# Patient Record
Sex: FEMALE | Race: BLACK OR AFRICAN AMERICAN | Employment: FULL TIME | URBAN - METROPOLITAN AREA
[De-identification: names, ages, dates, MRNs, and addresses within clinical notes are randomized per-mention and may not be internally consistent; named-entity substitution may affect disease eponyms.]

---

## 2020-08-26 ENCOUNTER — HOSPITAL ENCOUNTER (EMERGENCY)
Facility: HOSPITAL | Age: 30
Discharge: HOME | End: 2020-08-27
Attending: EMERGENCY MEDICINE
Payer: OTHER GOVERNMENT

## 2020-08-26 VITALS
BODY MASS INDEX: 35.08 KG/M2 | HEART RATE: 76 BPM | OXYGEN SATURATION: 97 % | RESPIRATION RATE: 18 BRPM | SYSTOLIC BLOOD PRESSURE: 135 MMHG | HEIGHT: 63 IN | DIASTOLIC BLOOD PRESSURE: 75 MMHG | WEIGHT: 198 LBS | TEMPERATURE: 99 F

## 2020-08-26 DIAGNOSIS — Z20.822 ENCOUNTER FOR LABORATORY TESTING FOR COVID-19 VIRUS: Primary | ICD-10-CM

## 2020-08-26 PROCEDURE — 99283 EMERGENCY DEPT VISIT LOW MDM: CPT

## 2020-08-27 NOTE — ED PROVIDER NOTES
Patient Seen in: [facility] AND [facility] Emergency Department      History   Patient presents with:  Testing    Stated Complaint: Testing     HPI    71-year-old female with no significant past medical history here for Covid testing.   Pt had exposure to her cou Movements: Extraocular movements intact. Neck:      Musculoskeletal: Neck supple. Cardiovascular:      Rate and Rhythm: Regular rhythm. Pulmonary:      Effort: Pulmonary effort is normal.   Abdominal:      Palpations: Abdomen is soft.    Musculoskelet for laboratory testing for COVID-19 virus  (primary encounter diagnosis)    Disposition:  Discharge  8/27/2020 12:12 am    Follow-up:  Jaqueline Davison MD  [address] [place] [address]  [phone]    Schedule an appointment as soon as p

## 2020-08-30 LAB — SARS-COV-2 BY PCR: NOT DETECTED

## 2021-12-24 ENCOUNTER — HOSPITAL ENCOUNTER (EMERGENCY)
Facility: HOSPITAL | Age: 31
Discharge: HOME | End: 2021-12-24
Payer: MEDICAID

## 2021-12-24 VITALS
RESPIRATION RATE: 18 BRPM | HEART RATE: 79 BPM | DIASTOLIC BLOOD PRESSURE: 78 MMHG | OXYGEN SATURATION: 99 % | SYSTOLIC BLOOD PRESSURE: 108 MMHG | BODY MASS INDEX: 36.5 KG/M2 | WEIGHT: 206 LBS | HEIGHT: 63 IN | TEMPERATURE: 98 F

## 2021-12-24 DIAGNOSIS — Z20.822 CLOSE EXPOSURE TO COVID-19 VIRUS: Primary | ICD-10-CM

## 2021-12-24 PROCEDURE — 99283 EMERGENCY DEPT VISIT LOW MDM: CPT

## 2021-12-24 NOTE — ED PROVIDER NOTES
Patient Seen in: [facility] [place] AND [facility] Emergency Department      History   Patient presents with:  Testing    Stated Complaint: testing, congestion postive exposure    Subjective:   HPI    79-year-old female presents the emergency department for Covid testin Effort: Pulmonary effort is normal.   Musculoskeletal:         General: Normal range of motion. Cervical back: Normal range of motion. Skin:     General: Skin is warm and dry. Capillary Refill: Capillary refill takes less than 2 seconds.

## 2023-05-23 ENCOUNTER — OFFICE VISIT (OUTPATIENT)
Dept: FAMILY MEDICINE CLINIC | Facility: CLINIC | Age: 33
End: 2023-05-23
Payer: COMMERCIAL

## 2023-05-23 ENCOUNTER — HOSPITAL ENCOUNTER (OUTPATIENT)
Dept: GENERAL RADIOLOGY | Age: 33
Discharge: HOME | End: 2023-05-23
Attending: STUDENT IN AN ORGANIZED HEALTH CARE EDUCATION/TRAINING PROGRAM
Payer: COMMERCIAL

## 2023-05-23 ENCOUNTER — TELEPHONE (OUTPATIENT)
Dept: PODIATRY CLINIC | Facility: CLINIC | Age: 33
End: 2023-05-23

## 2023-05-23 ENCOUNTER — LAB ENCOUNTER (OUTPATIENT)
Dept: LAB | Age: 33
End: 2023-05-23
Attending: STUDENT IN AN ORGANIZED HEALTH CARE EDUCATION/TRAINING PROGRAM
Payer: COMMERCIAL

## 2023-05-23 VITALS
DIASTOLIC BLOOD PRESSURE: 76 MMHG | RESPIRATION RATE: 16 BRPM | WEIGHT: 208.19 LBS | SYSTOLIC BLOOD PRESSURE: 102 MMHG | OXYGEN SATURATION: 98 % | TEMPERATURE: 98 F | HEIGHT: 63 IN | BODY MASS INDEX: 36.89 KG/M2 | HEART RATE: 102 BPM

## 2023-05-23 DIAGNOSIS — Z00.00 ROUTINE MEDICAL EXAM: Primary | ICD-10-CM

## 2023-05-23 DIAGNOSIS — E66.9 OBESITY, CLASS II, BMI 35-39.9: ICD-10-CM

## 2023-05-23 DIAGNOSIS — Q31.8 ANOMALY OF EPIGLOTTIS: ICD-10-CM

## 2023-05-23 DIAGNOSIS — M79.674 PAIN OF TOE OF RIGHT FOOT: ICD-10-CM

## 2023-05-23 DIAGNOSIS — D64.9 ANEMIA, UNSPECIFIED TYPE: ICD-10-CM

## 2023-05-23 DIAGNOSIS — Z23 ENCOUNTER FOR IMMUNIZATION: ICD-10-CM

## 2023-05-23 DIAGNOSIS — S92.511A CLOSED DISPLACED FRACTURE OF PROXIMAL PHALANX OF LESSER TOE OF RIGHT FOOT, INITIAL ENCOUNTER: Primary | ICD-10-CM

## 2023-05-23 DIAGNOSIS — S99.921A INJURY OF TOE ON RIGHT FOOT, INITIAL ENCOUNTER: ICD-10-CM

## 2023-05-23 DIAGNOSIS — Z00.00 ROUTINE MEDICAL EXAM: ICD-10-CM

## 2023-05-23 LAB
ALBUMIN SERPL-MCNC: 3.3 G/DL (ref 3.4–5)
ALBUMIN/GLOB SERPL: 0.8 {RATIO} (ref 1–2)
ALP LIVER SERPL-CCNC: 69 U/L
ALT SERPL-CCNC: 15 U/L
ANION GAP SERPL CALC-SCNC: 5 MMOL/L (ref 0–18)
AST SERPL-CCNC: 16 U/L (ref 15–37)
BILIRUB SERPL-MCNC: 0.3 MG/DL (ref 0.1–2)
BUN BLD-MCNC: 11 MG/DL (ref 7–18)
BUN/CREAT SERPL: 13.1 (ref 10–20)
CALCIUM BLD-MCNC: 8.9 MG/DL (ref 8.5–10.1)
CHLORIDE SERPL-SCNC: 110 MMOL/L (ref 98–112)
CHOLEST SERPL-MCNC: 234 MG/DL (ref ?–200)
CO2 SERPL-SCNC: 25 MMOL/L (ref 21–32)
CREAT BLD-MCNC: 0.84 MG/DL
DEPRECATED HBV CORE AB SER IA-ACNC: 9.4 NG/ML
DEPRECATED RDW RBC AUTO: 44 FL (ref 35.1–46.3)
ERYTHROCYTE [DISTWIDTH] IN BLOOD BY AUTOMATED COUNT: 14 % (ref 11–15)
EST. AVERAGE GLUCOSE BLD GHB EST-MCNC: 114 MG/DL (ref 68–126)
FASTING PATIENT LIPID ANSWER: YES
FASTING STATUS PATIENT QL REPORTED: YES
GFR SERPLBLD BASED ON 1.73 SQ M-ARVRAT: 94 ML/MIN/1.73M2 (ref 60–?)
GLOBULIN PLAS-MCNC: 4.4 G/DL (ref 2.8–4.4)
GLUCOSE BLD-MCNC: 83 MG/DL (ref 70–99)
HBA1C MFR BLD: 5.6 % (ref ?–5.7)
HCT VFR BLD AUTO: 34.2 %
HDLC SERPL-MCNC: 57 MG/DL (ref 40–59)
HGB BLD-MCNC: 11.6 G/DL
IRON SATN MFR SERPL: 17 %
IRON SERPL-MCNC: 65 UG/DL
LDLC SERPL CALC-MCNC: 165 MG/DL (ref ?–100)
MCH RBC QN AUTO: 29.3 PG (ref 26–34)
MCHC RBC AUTO-ENTMCNC: 33.9 G/DL (ref 31–37)
MCV RBC AUTO: 86.4 FL
NONHDLC SERPL-MCNC: 177 MG/DL (ref ?–130)
OSMOLALITY SERPL CALC.SUM OF ELEC: 289 MOSM/KG (ref 275–295)
PLATELET # BLD AUTO: 301 10(3)UL (ref 150–450)
POTASSIUM SERPL-SCNC: 3.6 MMOL/L (ref 3.5–5.1)
PROT SERPL-MCNC: 7.7 G/DL (ref 6.4–8.2)
RBC # BLD AUTO: 3.96 X10(6)UL
SODIUM SERPL-SCNC: 140 MMOL/L (ref 136–145)
TIBC SERPL-MCNC: 390 UG/DL (ref 240–450)
TRANSFERRIN SERPL-MCNC: 262 MG/DL (ref 200–360)
TRIGL SERPL-MCNC: 71 MG/DL (ref 30–149)
TSI SER-ACNC: 1.17 MIU/ML (ref 0.36–3.74)
VIT D+METAB SERPL-MCNC: 30.7 NG/ML (ref 30–100)
VLDLC SERPL CALC-MCNC: 14 MG/DL (ref 0–30)
WBC # BLD AUTO: 4.4 X10(3) UL (ref 4–11)

## 2023-05-23 PROCEDURE — 3078F DIAST BP <80 MM HG: CPT | Performed by: STUDENT IN AN ORGANIZED HEALTH CARE EDUCATION/TRAINING PROGRAM

## 2023-05-23 PROCEDURE — 3074F SYST BP LT 130 MM HG: CPT | Performed by: STUDENT IN AN ORGANIZED HEALTH CARE EDUCATION/TRAINING PROGRAM

## 2023-05-23 PROCEDURE — 82306 VITAMIN D 25 HYDROXY: CPT | Performed by: STUDENT IN AN ORGANIZED HEALTH CARE EDUCATION/TRAINING PROGRAM

## 2023-05-23 PROCEDURE — 82728 ASSAY OF FERRITIN: CPT | Performed by: STUDENT IN AN ORGANIZED HEALTH CARE EDUCATION/TRAINING PROGRAM

## 2023-05-23 PROCEDURE — 99385 PREV VISIT NEW AGE 18-39: CPT | Performed by: STUDENT IN AN ORGANIZED HEALTH CARE EDUCATION/TRAINING PROGRAM

## 2023-05-23 PROCEDURE — 83036 HEMOGLOBIN GLYCOSYLATED A1C: CPT | Performed by: STUDENT IN AN ORGANIZED HEALTH CARE EDUCATION/TRAINING PROGRAM

## 2023-05-23 PROCEDURE — 80061 LIPID PANEL: CPT | Performed by: STUDENT IN AN ORGANIZED HEALTH CARE EDUCATION/TRAINING PROGRAM

## 2023-05-23 PROCEDURE — 99203 OFFICE O/P NEW LOW 30 MIN: CPT | Performed by: STUDENT IN AN ORGANIZED HEALTH CARE EDUCATION/TRAINING PROGRAM

## 2023-05-23 PROCEDURE — 73660 X-RAY EXAM OF TOE(S): CPT | Performed by: STUDENT IN AN ORGANIZED HEALTH CARE EDUCATION/TRAINING PROGRAM

## 2023-05-23 PROCEDURE — 3008F BODY MASS INDEX DOCD: CPT | Performed by: STUDENT IN AN ORGANIZED HEALTH CARE EDUCATION/TRAINING PROGRAM

## 2023-05-23 PROCEDURE — 83540 ASSAY OF IRON: CPT | Performed by: STUDENT IN AN ORGANIZED HEALTH CARE EDUCATION/TRAINING PROGRAM

## 2023-05-23 PROCEDURE — 84466 ASSAY OF TRANSFERRIN: CPT | Performed by: STUDENT IN AN ORGANIZED HEALTH CARE EDUCATION/TRAINING PROGRAM

## 2023-05-23 PROCEDURE — 80050 GENERAL HEALTH PANEL: CPT | Performed by: STUDENT IN AN ORGANIZED HEALTH CARE EDUCATION/TRAINING PROGRAM

## 2023-05-23 PROCEDURE — 90715 TDAP VACCINE 7 YRS/> IM: CPT | Performed by: STUDENT IN AN ORGANIZED HEALTH CARE EDUCATION/TRAINING PROGRAM

## 2023-05-23 PROCEDURE — 90471 IMMUNIZATION ADMIN: CPT | Performed by: STUDENT IN AN ORGANIZED HEALTH CARE EDUCATION/TRAINING PROGRAM

## 2023-05-24 ENCOUNTER — PATIENT MESSAGE (OUTPATIENT)
Dept: FAMILY MEDICINE CLINIC | Facility: CLINIC | Age: 33
End: 2023-05-24

## 2023-05-24 ENCOUNTER — TELEPHONE (OUTPATIENT)
Dept: ORTHOPEDICS CLINIC | Facility: CLINIC | Age: 33
End: 2023-05-24

## 2023-05-24 NOTE — TELEPHONE ENCOUNTER
Last Imaging  XR TOE(S) (MIN 2 VIEWS), RIGHT 5TH (CPT=73660)  Narrative: PROCEDURE: XR TOE(S) (MIN 2 VIEWS), RIGHT 5TH (CPT=73660)     COMPARISON: None. INDICATIONS: Pain over the right 5th toe following trauma 1 day prior. Patient hit toe into her bed frame. TECHNIQUE: Three views  FINDINGS: Minimally displaced oblique fracture through the proximal phalanx right 5th digit. No dislocation.                  Impression: CONCLUSION: Minimally displaced oblique fracture proximal phalanx right 5th digit           Dictated by (CST): Everette Sommer MD on 5/23/2023 at 11:38 AM       Finalized by (CST): Everette Sommer MD on 5/23/2023 at 11:39 AM               Future Appointments   Date Time Provider Clara Snow   5/26/2023 10:00 AM Deidra Saldaña DPM [facility]

## 2023-05-24 NOTE — TELEPHONE ENCOUNTER
Spoke with patient advised her to try calling [facility] [phone] for fracture follow up right 5th toe. Advised patient if she is still unsuccessful in obtaining an appointment with [facility] within a week of injury to give the office a call back. Patient verbalized understanding had no further questions.

## 2023-05-24 NOTE — TELEPHONE ENCOUNTER
S/w Kimberley    Pt stated was unsuccessful scheduling an appt with podiatrist Dr. Max Staples since they need information from our office. I informed the pt I will call the podiatrist office to provide information. Pt stated will go to either [place] or [place] locations which ever has soonest appts.

## 2023-05-24 NOTE — TELEPHONE ENCOUNTER
S/w Jess at Dr. Chandu Cai office who stated there are no soon appts since one of there providers is on maternity leave. Their office is awaiting response from message sent to Dr. Jadon Chu to inquire if this pt can be added to his schedule.

## 2023-05-24 NOTE — TELEPHONE ENCOUNTER
S/w Charlie       Pt informed that 's office has no available appts but awaiting response from the physician if she can be added to his schedule. Pt agreed to another provider group to inquire availability to a soon appt. Pt informed has PPO plan so can self refer as well. Pt given contact information for Dr. Krishna Schrader of [address] [facility] and [facility]. Pt will inquire with this new recommendation and if they need an order our office will submit a new order. Pt agreed to the plan.

## 2023-05-24 NOTE — TELEPHONE ENCOUNTER
Imaging was completed for this patient for a RT Foot- 5th toe fx, but it needs to be reviewed to see if additional imaging is needed. If so, please enter the appropriate RX and let the patient know to come in before their appointment to complete the additional imaging. Thank you!     Future Appointments   Date Time Provider Clara Snow   5/26/2023 10:00 AM Jacquelyn Simeon, DPM [facility]

## 2023-05-25 NOTE — TELEPHONE ENCOUNTER
Looks like patient has appt 5/26/23 at 10 AM with Dr. Noel Arredondo already no further action needed for [facility] podiatry.

## 2023-05-26 ENCOUNTER — OFFICE VISIT (OUTPATIENT)
Dept: ORTHOPEDICS CLINIC | Facility: CLINIC | Age: 33
End: 2023-05-26
Payer: COMMERCIAL

## 2023-05-26 VITALS — HEIGHT: 63 IN | BODY MASS INDEX: 36.86 KG/M2 | WEIGHT: 208 LBS

## 2023-05-26 DIAGNOSIS — S92.514A CLOSED NONDISPLACED FRACTURE OF PROXIMAL PHALANX OF LESSER TOE OF RIGHT FOOT, INITIAL ENCOUNTER: Primary | ICD-10-CM

## 2023-05-26 DIAGNOSIS — D50.9 IRON DEFICIENCY ANEMIA, UNSPECIFIED IRON DEFICIENCY ANEMIA TYPE: ICD-10-CM

## 2023-05-26 DIAGNOSIS — E78.00 HYPERCHOLESTEROLEMIA: Primary | ICD-10-CM

## 2023-05-26 PROBLEM — D64.9 ANEMIA: Status: ACTIVE | Noted: 2023-05-26

## 2023-05-26 PROCEDURE — 3008F BODY MASS INDEX DOCD: CPT | Performed by: PODIATRIST

## 2023-05-26 PROCEDURE — 99203 OFFICE O/P NEW LOW 30 MIN: CPT | Performed by: PODIATRIST

## 2023-06-30 ENCOUNTER — HOSPITAL ENCOUNTER (OUTPATIENT)
Dept: GENERAL RADIOLOGY | Age: 33
Discharge: HOME | End: 2023-06-30
Attending: PODIATRIST
Payer: COMMERCIAL

## 2023-06-30 ENCOUNTER — OFFICE VISIT (OUTPATIENT)
Dept: ORTHOPEDICS CLINIC | Facility: CLINIC | Age: 33
End: 2023-06-30
Payer: COMMERCIAL

## 2023-06-30 VITALS — HEIGHT: 63 IN | BODY MASS INDEX: 36.86 KG/M2 | WEIGHT: 208 LBS

## 2023-06-30 DIAGNOSIS — S92.514A CLOSED NONDISPLACED FRACTURE OF PROXIMAL PHALANX OF LESSER TOE OF RIGHT FOOT, INITIAL ENCOUNTER: ICD-10-CM

## 2023-06-30 DIAGNOSIS — S92.514D CLOSED NONDISPLACED FRACTURE OF PROXIMAL PHALANX OF LESSER TOE OF RIGHT FOOT WITH ROUTINE HEALING, SUBSEQUENT ENCOUNTER: Primary | ICD-10-CM

## 2023-06-30 PROCEDURE — 73660 X-RAY EXAM OF TOE(S): CPT | Performed by: PODIATRIST

## 2023-06-30 PROCEDURE — 3008F BODY MASS INDEX DOCD: CPT | Performed by: PODIATRIST

## 2023-06-30 PROCEDURE — 99024 POSTOP FOLLOW-UP VISIT: CPT | Performed by: PODIATRIST

## 2023-08-09 ENCOUNTER — OFFICE VISIT (OUTPATIENT)
Dept: OBGYN CLINIC | Facility: CLINIC | Age: 33
End: 2023-08-09

## 2023-08-09 ENCOUNTER — LAB ENCOUNTER (OUTPATIENT)
Dept: LAB | Age: 33
End: 2023-08-09
Attending: ADVANCED PRACTICE MIDWIFE
Payer: COMMERCIAL

## 2023-08-09 VITALS
HEIGHT: 63 IN | WEIGHT: 210 LBS | DIASTOLIC BLOOD PRESSURE: 83 MMHG | SYSTOLIC BLOOD PRESSURE: 120 MMHG | BODY MASS INDEX: 37.21 KG/M2

## 2023-08-09 DIAGNOSIS — Z30.09 BIRTH CONTROL COUNSELING: ICD-10-CM

## 2023-08-09 DIAGNOSIS — Z11.3 SCREENING EXAMINATION FOR STD (SEXUALLY TRANSMITTED DISEASE): Primary | ICD-10-CM

## 2023-08-09 DIAGNOSIS — Z11.3 SCREENING EXAMINATION FOR STD (SEXUALLY TRANSMITTED DISEASE): ICD-10-CM

## 2023-08-09 DIAGNOSIS — Z12.4 SCREENING FOR CERVICAL CANCER: ICD-10-CM

## 2023-08-09 DIAGNOSIS — D50.9 IRON DEFICIENCY ANEMIA, UNSPECIFIED IRON DEFICIENCY ANEMIA TYPE: ICD-10-CM

## 2023-08-09 LAB
DEPRECATED HBV CORE AB SER IA-ACNC: 5.7 NG/ML
DEPRECATED RDW RBC AUTO: 48.3 FL (ref 35.1–46.3)
ERYTHROCYTE [DISTWIDTH] IN BLOOD BY AUTOMATED COUNT: 14.4 % (ref 11–15)
HBV SURFACE AG SER-ACNC: <0.1 [IU]/L
HBV SURFACE AG SERPL QL IA: NONREACTIVE
HCT VFR BLD AUTO: 36.9 %
HCV AB SERPL QL IA: NONREACTIVE
HGB BLD-MCNC: 12.1 G/DL
IRON SATN MFR SERPL: 8 %
IRON SERPL-MCNC: 37 UG/DL
MCH RBC QN AUTO: 30 PG (ref 26–34)
MCHC RBC AUTO-ENTMCNC: 32.8 G/DL (ref 31–37)
MCV RBC AUTO: 91.3 FL
PLATELET # BLD AUTO: 300 10(3)UL (ref 150–450)
RBC # BLD AUTO: 4.04 X10(6)UL
TIBC SERPL-MCNC: 441 UG/DL (ref 240–450)
TRANSFERRIN SERPL-MCNC: 296 MG/DL (ref 200–360)
WBC # BLD AUTO: 6.4 X10(3) UL (ref 4–11)

## 2023-08-09 PROCEDURE — 87389 HIV-1 AG W/HIV-1&-2 AB AG IA: CPT | Performed by: ADVANCED PRACTICE MIDWIFE

## 2023-08-09 PROCEDURE — 3074F SYST BP LT 130 MM HG: CPT | Performed by: ADVANCED PRACTICE MIDWIFE

## 2023-08-09 PROCEDURE — 3008F BODY MASS INDEX DOCD: CPT | Performed by: ADVANCED PRACTICE MIDWIFE

## 2023-08-09 PROCEDURE — 84466 ASSAY OF TRANSFERRIN: CPT | Performed by: STUDENT IN AN ORGANIZED HEALTH CARE EDUCATION/TRAINING PROGRAM

## 2023-08-09 PROCEDURE — 82728 ASSAY OF FERRITIN: CPT | Performed by: STUDENT IN AN ORGANIZED HEALTH CARE EDUCATION/TRAINING PROGRAM

## 2023-08-09 PROCEDURE — 87340 HEPATITIS B SURFACE AG IA: CPT | Performed by: ADVANCED PRACTICE MIDWIFE

## 2023-08-09 PROCEDURE — 83540 ASSAY OF IRON: CPT | Performed by: STUDENT IN AN ORGANIZED HEALTH CARE EDUCATION/TRAINING PROGRAM

## 2023-08-09 PROCEDURE — 86780 TREPONEMA PALLIDUM: CPT | Performed by: ADVANCED PRACTICE MIDWIFE

## 2023-08-09 PROCEDURE — 99385 PREV VISIT NEW AGE 18-39: CPT | Performed by: ADVANCED PRACTICE MIDWIFE

## 2023-08-09 PROCEDURE — 85027 COMPLETE CBC AUTOMATED: CPT | Performed by: STUDENT IN AN ORGANIZED HEALTH CARE EDUCATION/TRAINING PROGRAM

## 2023-08-09 PROCEDURE — 3079F DIAST BP 80-89 MM HG: CPT | Performed by: ADVANCED PRACTICE MIDWIFE

## 2023-08-09 PROCEDURE — 86803 HEPATITIS C AB TEST: CPT | Performed by: ADVANCED PRACTICE MIDWIFE

## 2023-08-09 RX ORDER — ETONOGESTREL AND ETHINYL ESTRADIOL 11.7; 2.7 MG/1; MG/1
1 INSERT, EXTENDED RELEASE VAGINAL ONCE
Qty: 3 RING | Refills: 3 | Status: SHIPPED | OUTPATIENT
Start: 2023-08-09 | End: 2023-08-09

## 2023-08-10 LAB — HPV I/H RISK 1 DNA SPEC QL NAA+PROBE: NEGATIVE

## 2023-08-11 LAB
C TRACH DNA SPEC QL NAA+PROBE: NEGATIVE
N GONORRHOEA DNA SPEC QL NAA+PROBE: NEGATIVE
T PALLIDUM AB SER QL: NEGATIVE

## 2023-08-15 ENCOUNTER — PATIENT MESSAGE (OUTPATIENT)
Dept: FAMILY MEDICINE CLINIC | Facility: CLINIC | Age: 33
End: 2023-08-15

## 2023-08-15 DIAGNOSIS — D50.9 IRON DEFICIENCY ANEMIA, UNSPECIFIED IRON DEFICIENCY ANEMIA TYPE: Primary | ICD-10-CM

## 2023-08-18 ENCOUNTER — OFFICE VISIT (OUTPATIENT)
Dept: HEMATOLOGY/ONCOLOGY | Facility: HOSPITAL | Age: 33
End: 2023-08-18
Attending: STUDENT IN AN ORGANIZED HEALTH CARE EDUCATION/TRAINING PROGRAM
Payer: COMMERCIAL

## 2023-08-18 VITALS
TEMPERATURE: 99 F | RESPIRATION RATE: 16 BRPM | HEIGHT: 63 IN | WEIGHT: 211 LBS | OXYGEN SATURATION: 96 % | BODY MASS INDEX: 37.39 KG/M2 | HEART RATE: 86 BPM | SYSTOLIC BLOOD PRESSURE: 104 MMHG | DIASTOLIC BLOOD PRESSURE: 66 MMHG

## 2023-08-18 DIAGNOSIS — N93.9 UTERINE BLEEDING: ICD-10-CM

## 2023-08-18 DIAGNOSIS — D50.9 IRON DEFICIENCY ANEMIA, UNSPECIFIED IRON DEFICIENCY ANEMIA TYPE: Primary | ICD-10-CM

## 2023-08-18 PROBLEM — N92.4 EXCESSIVE BLEEDING IN PREMENOPAUSAL PERIOD: Status: ACTIVE | Noted: 2023-08-18

## 2023-08-18 PROCEDURE — 99244 OFF/OP CNSLTJ NEW/EST MOD 40: CPT | Performed by: INTERNAL MEDICINE

## 2023-08-18 NOTE — PROGRESS NOTES
Hematology Consult    Date: 23      Iron def anemia      HPI:  34year old  With iron def anemia from uterine bleeding    She has poor tolerance to oral iron with GI side effects      Feels very fatigued        History reviewed. No pertinent past medical history. Social History    Socioeconomic History      Marital status: Single    Tobacco Use      Smoking status: Never      Smokeless tobacco: Never    Vaping Use      Vaping Use: Never used    Substance and Sexual Activity      Alcohol use: Yes        Comment: socially      Drug use: Never      Family History   Problem Relation Age of Onset    Hypertension Mother     High Blood Pressure Mother     Other (graves dis) Mother     Cancer Paternal Grandmother         ???     No Known Allergies    [] Etonogestrel-Ethinyl Estradiol (NUVARING) 0.12-0.015 MG/24HR Vaginal Ring, Place 1 Ring vaginally one time for 1 dose., Disp: 3 Ring, Rfl: 3    No current facility-administered medications on file prior to visit.           A/P:    Iron deficiency anemia from uterine bleeding  - Poor tolerance to oral iron  - We will set up for IV iron    Data: Moderate CBC iron TIBC ferritin

## 2023-08-18 NOTE — PROGRESS NOTES
IV iron infusion explained including test dose, administration time, side effects. Discussed low possibility of infusion/allergic reaction, risk of infiltration with staining of skin possible. Instructed to eat prior to appointment and hydrate well before and after infusion. All questions answered and patient confirms understanding. Reinforced importance of f/u in 2-3 months with labs and Dr Joce Soliman appt to ensure iron improved. Confirms understanding and will await  call.

## 2023-08-26 ENCOUNTER — TELEPHONE (OUTPATIENT)
Dept: OBGYN UNIT | Facility: HOSPITAL | Age: 33
End: 2023-08-26

## 2023-08-26 RX ORDER — METRONIDAZOLE 500 MG/1
500 TABLET ORAL 2 TIMES DAILY
Qty: 14 TABLET | Refills: 0 | Status: SHIPPED | OUTPATIENT
Start: 2023-08-26 | End: 2023-09-02

## 2023-08-26 NOTE — TELEPHONE ENCOUNTER
----- Message from Darshana Yeager RN sent at 8/25/2023  8:13 AM CDT -----  Regarding: FW: Test Results   Contact: [phone]  Please Advise  ----- Message -----  From: Camryn Sauceda  Sent: 8/24/2023   9:36 PM CDT  To: [facility] Clinical Pool  Subject: Test Results                                     I saw the test results that are suggestive of BV. Can I have a prescription for this please because I am definitely experiencing the symptoms.

## 2023-09-06 ENCOUNTER — OFFICE VISIT (OUTPATIENT)
Dept: HEMATOLOGY/ONCOLOGY | Facility: HOSPITAL | Age: 33
End: 2023-09-06
Attending: INTERNAL MEDICINE
Payer: COMMERCIAL

## 2023-09-06 VITALS
HEART RATE: 81 BPM | OXYGEN SATURATION: 97 % | RESPIRATION RATE: 18 BRPM | DIASTOLIC BLOOD PRESSURE: 79 MMHG | TEMPERATURE: 98 F | SYSTOLIC BLOOD PRESSURE: 117 MMHG

## 2023-09-06 DIAGNOSIS — N92.4 EXCESSIVE BLEEDING IN PREMENOPAUSAL PERIOD: ICD-10-CM

## 2023-09-06 DIAGNOSIS — D64.9 ANEMIA: Primary | ICD-10-CM

## 2023-09-06 DIAGNOSIS — D50.0 IRON DEFICIENCY ANEMIA DUE TO CHRONIC BLOOD LOSS: ICD-10-CM

## 2023-09-06 PROCEDURE — 96365 THER/PROPH/DIAG IV INF INIT: CPT

## 2023-09-06 PROCEDURE — 96376 TX/PRO/DX INJ SAME DRUG ADON: CPT

## 2023-09-06 NOTE — PROGRESS NOTES
Patient arrives ambulating independently for IRON DEXTRAN infusion. C/O fatigue, SOB, and occassional dizziness upon arrival.  This is patient's first time receiving iron dextran. Test dose administered, 1 hour post-infusion observation completed with no s/s of adverse reaction. Full iron dextran dose administered, 30 minute post-infusion observation completed with no s/s of adverse reaction. Vital signs WNL. PIV removed, 2x2 gauze and coban applied. Discharged home, stable with future appointments scheduled.

## 2023-10-03 ENCOUNTER — HOSPITAL ENCOUNTER (EMERGENCY)
Facility: HOSPITAL | Age: 33
Discharge: HOME | End: 2023-10-03
Attending: EMERGENCY MEDICINE

## 2023-10-03 VITALS
HEART RATE: 83 BPM | HEIGHT: 63 IN | TEMPERATURE: 98 F | WEIGHT: 212 LBS | DIASTOLIC BLOOD PRESSURE: 80 MMHG | BODY MASS INDEX: 37.56 KG/M2 | SYSTOLIC BLOOD PRESSURE: 115 MMHG | OXYGEN SATURATION: 100 % | RESPIRATION RATE: 18 BRPM

## 2023-10-03 DIAGNOSIS — T24.011A BURN OF RIGHT THIGH, UNSPECIFIED BURN DEGREE, INITIAL ENCOUNTER: Primary | ICD-10-CM

## 2023-10-03 LAB — B-HCG UR QL: NEGATIVE

## 2023-10-03 PROCEDURE — 99284 EMERGENCY DEPT VISIT MOD MDM: CPT

## 2023-10-03 PROCEDURE — 96375 TX/PRO/DX INJ NEW DRUG ADDON: CPT

## 2023-10-03 PROCEDURE — 99283 EMERGENCY DEPT VISIT LOW MDM: CPT

## 2023-10-03 PROCEDURE — 81025 URINE PREGNANCY TEST: CPT

## 2023-10-03 PROCEDURE — 96374 THER/PROPH/DIAG INJ IV PUSH: CPT

## 2023-10-03 RX ORDER — CEFAZOLIN SODIUM/WATER 2 G/20 ML
2 SYRINGE (ML) INTRAVENOUS ONCE
Status: COMPLETED | OUTPATIENT
Start: 2023-10-03 | End: 2023-10-03

## 2023-10-03 RX ORDER — KETOROLAC TROMETHAMINE 15 MG/ML
15 INJECTION, SOLUTION INTRAMUSCULAR; INTRAVENOUS ONCE
Status: COMPLETED | OUTPATIENT
Start: 2023-10-03 | End: 2023-10-03

## 2023-10-03 RX ORDER — KETOROLAC TROMETHAMINE 10 MG/1
10 TABLET, FILM COATED ORAL EVERY 6 HOURS PRN
Qty: 20 TABLET | Refills: 0 | Status: SHIPPED | OUTPATIENT
Start: 2023-10-03 | End: 2023-10-08

## 2023-10-03 RX ORDER — CEPHALEXIN 500 MG/1
500 CAPSULE ORAL 2 TIMES DAILY
Qty: 14 CAPSULE | Refills: 0 | Status: SHIPPED | OUTPATIENT
Start: 2023-10-03 | End: 2023-10-10

## 2023-10-03 NOTE — ED INITIAL ASSESSMENT (HPI)
PT to ED, 15 minutes PTA spilled hot coffee on R thigh, pain 10/10 R thigh non radiating. Denies CP, DANUTA, nausea, vomiting, diarrhea, constipation.

## 2024-01-20 ENCOUNTER — HOSPITAL ENCOUNTER (OUTPATIENT)
Age: 34
Discharge: HOME | End: 2024-01-20
Attending: EMERGENCY MEDICINE
Payer: COMMERCIAL

## 2024-01-20 VITALS
DIASTOLIC BLOOD PRESSURE: 71 MMHG | SYSTOLIC BLOOD PRESSURE: 112 MMHG | OXYGEN SATURATION: 96 % | HEART RATE: 93 BPM | TEMPERATURE: 98 F | RESPIRATION RATE: 18 BRPM

## 2024-01-20 DIAGNOSIS — J06.9 VIRAL URI: Primary | ICD-10-CM

## 2024-01-20 LAB
S PYO AG THROAT QL IA.RAPID: NEGATIVE
SARS-COV-2 RNA RESP QL NAA+PROBE: NOT DETECTED

## 2024-01-20 PROCEDURE — 99212 OFFICE O/P EST SF 10 MIN: CPT

## 2024-01-20 PROCEDURE — 87651 STREP A DNA AMP PROBE: CPT | Performed by: EMERGENCY MEDICINE

## 2024-01-20 PROCEDURE — 99213 OFFICE O/P EST LOW 20 MIN: CPT

## 2024-01-20 NOTE — ED PROVIDER NOTES
Patient Seen in: [facility] [place]      History     Chief Complaint   Patient presents with    Sore Throat     Stated Complaint: sore throat    Subjective:   HPI    Patient is a 33-year-old female, unvaccinated for COVID, who presents now with sore throat.  Patient states the symptoms started on Thursday.  The patient's son has similar symptoms.  Mother denies any runny nose, cough, fever.  The patient denies any COVID concerns    Objective:   History reviewed. No pertinent past medical history.           Past Surgical History:   Procedure Laterality Date                      Social History     Socioeconomic History    Marital status: Single   Tobacco Use    Smoking status: Never    Smokeless tobacco: Never   Vaping Use    Vaping Use: Never used   Substance and Sexual Activity    Alcohol use: Yes     Comment: socially    Drug use: Never              Review of Systems    Positive for stated complaint: sore throat  Other systems are as noted in HPI.  Constitutional and vital signs reviewed.      All other systems reviewed and negative except as noted above.    Physical Exam     ED Triage Vitals [24 1423]   /71   Pulse 93   Resp 18   Temp 98.2 °F (36.8 °C)   Temp src Temporal   SpO2 96 %   O2 Device None (Room air)       Current:/71   Pulse 93   Temp 98.2 °F (36.8 °C) (Temporal)   Resp 18   LMP 2023 (Approximate)   SpO2 96%         Physical Exam    Constitutional: Well-developed well-nourished in no acute distress  Head: Normocephalic, no swelling or tenderness  Eyes: Nonicteric sclera, no conjunctival injection  ENT: TMs are clear and flat bilaterally.  There is minimal posterior pharyngeal erythema  Chest: Clear to auscultation, no tenderness  Cardiovascular: Regular rate and rhythm without murmur  Abdomen: Soft, nontender and nondistended  Neurologic: Patient is awake, alert and oriented ×3.  The patient's motor strength is 5 out of 5 and symmetric in the upper and lower  extremities bilaterally  Extremities: No focal swelling or tenderness  Skin: No pallor, no redness or warmth to the touch      ED Course     Labs Reviewed   RAPID STREP A - Normal   RAPID SARS-COV-2 BY PCR - Normal             Patient's negative COVID, negative strep were reviewed with the patient.  Probable viral etiology of the patient's symptoms         MDM      Viral URI versus strep throat                                   Medical Decision Making      Disposition and Plan     Clinical Impression:  1. Viral URI         Disposition:  Discharge  1/20/2024  2:48 pm    Follow-up:  Jeremie Lemos MD  [address] [place] [id number]  [phone]      As needed          Medications Prescribed:  There are no discharge medications for this patient.

## 2024-03-19 ENCOUNTER — TELEPHONE (OUTPATIENT)
Dept: FAMILY MEDICINE CLINIC | Facility: CLINIC | Age: 34
End: 2024-03-19

## 2024-03-19 NOTE — TELEPHONE ENCOUNTER
Patient is scheduled for a physical on 03/27/2024. Last physical was on 05/23/2023. Please reschedule patient for after May 23rd

## 2024-03-27 ENCOUNTER — OFFICE VISIT (OUTPATIENT)
Dept: FAMILY MEDICINE CLINIC | Facility: CLINIC | Age: 34
End: 2024-03-27
Payer: COMMERCIAL

## 2024-03-27 VITALS
HEART RATE: 101 BPM | TEMPERATURE: 97 F | BODY MASS INDEX: 38.62 KG/M2 | SYSTOLIC BLOOD PRESSURE: 102 MMHG | WEIGHT: 218 LBS | DIASTOLIC BLOOD PRESSURE: 80 MMHG | OXYGEN SATURATION: 99 % | HEIGHT: 63 IN

## 2024-03-27 DIAGNOSIS — E78.00 HYPERCHOLESTEROLEMIA: ICD-10-CM

## 2024-03-27 DIAGNOSIS — Z30.09 FAMILY PLANNING COUNSELING: Primary | ICD-10-CM

## 2024-03-27 DIAGNOSIS — Z01.818 TUBAL LIGATION EVALUATION: ICD-10-CM

## 2024-03-27 DIAGNOSIS — D50.9 IRON DEFICIENCY ANEMIA, UNSPECIFIED IRON DEFICIENCY ANEMIA TYPE: ICD-10-CM

## 2024-03-27 PROCEDURE — 3074F SYST BP LT 130 MM HG: CPT | Performed by: STUDENT IN AN ORGANIZED HEALTH CARE EDUCATION/TRAINING PROGRAM

## 2024-03-27 PROCEDURE — 99213 OFFICE O/P EST LOW 20 MIN: CPT | Performed by: STUDENT IN AN ORGANIZED HEALTH CARE EDUCATION/TRAINING PROGRAM

## 2024-03-27 PROCEDURE — 3008F BODY MASS INDEX DOCD: CPT | Performed by: STUDENT IN AN ORGANIZED HEALTH CARE EDUCATION/TRAINING PROGRAM

## 2024-03-27 PROCEDURE — 3079F DIAST BP 80-89 MM HG: CPT | Performed by: STUDENT IN AN ORGANIZED HEALTH CARE EDUCATION/TRAINING PROGRAM

## 2024-03-27 NOTE — PROGRESS NOTES
Subjective:   Charlie Arango is a 33 year old female who presents for Checkup (Tubal ligation )     33-year-old female coming in to discuss tubal ligation.  Would like referral to GYN in that regard.  Currently using NuvaRing for contraception.  Followed up with hematology and received 1 iron infusion.  Currently taking liquid iron supplement Geritol.    History/Other:    Chief Complaint Reviewed and Verified  Nursing Notes Reviewed and   Verified  Tobacco Reviewed  Allergies Reviewed  Medical History   Reviewed  Surgical History Reviewed  Family History Reviewed  Social   History Reviewed         Tobacco:  She has never smoked tobacco.    No current outpatient medications on file.         Review of Systems:  Review of Systems   Constitutional:  Negative for chills, diaphoresis and fever.   HENT:  Negative for congestion, ear discharge, ear pain, sinus pressure, sinus pain and sore throat.    Eyes:  Negative for pain and discharge.   Respiratory:  Negative for cough, chest tightness, shortness of breath and wheezing.    Cardiovascular:  Negative for chest pain and palpitations.   Gastrointestinal:  Negative for abdominal pain, diarrhea, nausea and vomiting.   Endocrine: Negative for cold intolerance and heat intolerance.   Genitourinary:  Negative for dysuria, flank pain, frequency and urgency.   Musculoskeletal:  Negative for joint swelling.   Skin:  Negative for rash.   Neurological:  Negative for dizziness, syncope and headaches.   Psychiatric/Behavioral:  Negative for confusion and hallucinations.        Objective:   /80   Pulse 101   Temp 97.2 °F (36.2 °C)   Ht 5' 3\" (1.6 m)   Wt 218 lb (98.9 kg)   LMP 03/09/2024 (Exact Date)   SpO2 99%   BMI 38.62 kg/m²  Estimated body mass index is 38.62 kg/m² as calculated from the following:    Height as of this encounter: 5' 3\" (1.6 m).    Weight as of this encounter: 218 lb (98.9 kg).  Physical Exam  Constitutional:       General: She is not in acute  distress.     Appearance: Normal appearance. She is not ill-appearing or toxic-appearing.   HENT:      Head: Normocephalic and atraumatic.   Cardiovascular:      Rate and Rhythm: Normal rate and regular rhythm.      Heart sounds: Normal heart sounds. No murmur heard.     No gallop.   Pulmonary:      Effort: Pulmonary effort is normal. No respiratory distress.      Breath sounds: Normal breath sounds. No stridor. No wheezing, rhonchi or rales.   Abdominal:      General: Bowel sounds are normal.      Palpations: Abdomen is soft.      Tenderness: There is no abdominal tenderness. There is no guarding.   Musculoskeletal:      Cervical back: Normal range of motion and neck supple. No rigidity or tenderness.   Skin:     General: Skin is warm and dry.   Neurological:      General: No focal deficit present.      Mental Status: She is alert and oriented to person, place, and time. Mental status is at baseline.   Psychiatric:         Mood and Affect: Mood normal.         Behavior: Behavior normal.         Thought Content: Thought content normal.         Judgment: Judgment normal.         Assessment & Plan:   1. Family planning counseling (Primary)  Patient came in inquiring about referral for tubal ligation.  -Discussed all different types of contraception with patient.  Patient will follow-up with GYN.  -     [facility]G Referral - In Network  2. Tubal ligation evaluation  -     OBG Referral - In Network  3. Iron deficiency anemia, unspecified iron deficiency anemia type  Followed up with hematology and received 1 iron infusion.  Currently taking liquid iron supplement Geritol.  -Follow-up with hematology with labs accordingly  4. Hypercholesterolemia  -Healthy diet and lifestyle  -Weight loss  -Lipid panel at physical          No follow-ups on file.    Jeremie Lemos MD, 3/27/2024, 10:04 AM

## 2024-04-29 ENCOUNTER — OFFICE VISIT (OUTPATIENT)
Dept: SURGERY | Facility: CLINIC | Age: 34
End: 2024-04-29
Payer: COMMERCIAL

## 2024-04-29 VITALS
HEIGHT: 63 IN | BODY MASS INDEX: 38.62 KG/M2 | WEIGHT: 218 LBS | SYSTOLIC BLOOD PRESSURE: 110 MMHG | DIASTOLIC BLOOD PRESSURE: 70 MMHG

## 2024-04-29 DIAGNOSIS — D50.9 IRON DEFICIENCY ANEMIA, UNSPECIFIED IRON DEFICIENCY ANEMIA TYPE: ICD-10-CM

## 2024-04-29 DIAGNOSIS — Z30.09 CONTRACEPTIVE USE EDUCATION: ICD-10-CM

## 2024-04-29 DIAGNOSIS — N92.4 EXCESSIVE BLEEDING IN PREMENOPAUSAL PERIOD: Primary | ICD-10-CM

## 2024-04-29 NOTE — PROGRESS NOTES
NEW GYN H&P     2024  12:20 PM    Chief Complaint   Patient presents with    New Patient     New pt, here for birth control, thinking about Paragard IUD    .    HPI: Patient is a 33 year old  LMP 2024 referred by PCP Dr. Lemos to discuss possible copper IUD for birth control. History reviewed - significant for heavy menstrual cycles and iron deficiency anemia. Not interested in hormonal progestin-IUDs which are often used to decrease heavy flow. Recommended pelvic USN to assess uterine architecture and exclude fibroids which are often a contraindication to copper IUDs which can exacerbate menorrhagia. Will return after USN to discuss results and whether IUD an option. Questions answered and agrees with plan.    Patient's last menstrual period was 2024 (exact date).    OB History    Para Term  AB Living   2       1 1   SAB IAB Ectopic Multiple Live Births           1      # Outcome Date GA Lbr Tin/2nd Weight Sex Type Anes PTL Lv   2  10/15/13   10 lb 6 oz (4.706 kg) M CS-Unspec EPI N ARIC   1 AB                GYN hx:    Hx Prior Abnormal Pap: No  Pap Date: 23  Pap Result Notes: wnl/-hpv  CONTRACEPTION: Rhythm  LAST MAMMOGRAM: N/A      No current outpatient medications on file.       Past Medical History:    Patient denies medical problems     Past Surgical History:   Procedure Laterality Date      10/15/2013     No Known Allergies  Family History   Problem Relation Age of Onset    Hypertension Mother     High Blood Pressure Mother     Other (graves dis) Mother     Cancer Paternal Grandmother         ???    Colon Cancer Neg     Uterine Cancer Neg     Ovarian Cancer Neg     Breast Cancer Neg      Social History     Socioeconomic History    Marital status: Single   Tobacco Use    Smoking status: Never    Smokeless tobacco: Never   Vaping Use    Vaping status: Never Used   Substance and Sexual Activity    Alcohol use: Yes     Comment: socially    Drug use: Never     Sexual activity: Yes     Partners: Male     Social History     Social History Narrative    Not on file       ROS:     Review of Systems:  A comprehensive 10 point ROS was completed. All pertinent positives and negatives noted in the HPI.     /70   Ht 63\"   Wt 218 lb (98.9 kg)   LMP 04/09/2024 (Exact Date)   BMI 38.62 kg/m²     A/P: Patient is 33 year old female     1. Excessive bleeding in premenopausal period with iron deficiency anemia  - Pelvic USN ordered    2. Contraceptive use education - considering copper IUD  - Return after USN to discuss findings and possible IUD insertion      Total time spent = 30 minutes  >50% visit = face to face counseling and coordination of care        4/29/2024  Jess Garcia MD

## 2024-05-15 ENCOUNTER — ULTRASOUND ENCOUNTER (OUTPATIENT)
Dept: OBGYN CLINIC | Facility: CLINIC | Age: 34
End: 2024-05-15

## 2024-05-15 ENCOUNTER — OFFICE VISIT (OUTPATIENT)
Dept: OBGYN CLINIC | Facility: CLINIC | Age: 34
End: 2024-05-15

## 2024-05-15 VITALS
SYSTOLIC BLOOD PRESSURE: 116 MMHG | WEIGHT: 218 LBS | BODY MASS INDEX: 38.62 KG/M2 | HEIGHT: 63 IN | DIASTOLIC BLOOD PRESSURE: 78 MMHG

## 2024-05-15 DIAGNOSIS — D21.9 FIBROIDS: Primary | ICD-10-CM

## 2024-05-15 DIAGNOSIS — Z30.430 ENCOUNTER FOR INSERTION OF INTRAUTERINE CONTRACEPTIVE DEVICE (IUD): ICD-10-CM

## 2024-05-15 DIAGNOSIS — N93.9 ABNORMAL UTERINE BLEEDING (AUB): Primary | ICD-10-CM

## 2024-05-15 PROCEDURE — 58300 INSERT INTRAUTERINE DEVICE: CPT | Performed by: OBSTETRICS & GYNECOLOGY

## 2024-05-15 PROCEDURE — 3078F DIAST BP <80 MM HG: CPT | Performed by: OBSTETRICS & GYNECOLOGY

## 2024-05-15 PROCEDURE — 3074F SYST BP LT 130 MM HG: CPT | Performed by: OBSTETRICS & GYNECOLOGY

## 2024-05-15 PROCEDURE — 76830 TRANSVAGINAL US NON-OB: CPT | Performed by: OBSTETRICS & GYNECOLOGY

## 2024-05-15 PROCEDURE — 3008F BODY MASS INDEX DOCD: CPT | Performed by: OBSTETRICS & GYNECOLOGY

## 2024-05-15 PROCEDURE — 99214 OFFICE O/P EST MOD 30 MIN: CPT | Performed by: OBSTETRICS & GYNECOLOGY

## 2024-05-15 NOTE — PROCEDURES
[facility]  Obstetrics and Gynecology  IUD Insertion Procedure Note  Jess Garcia MD    IUD Insertion:     Pregnancy Results: negative from urine test   Birth control method(s) used:  none    Procedure discussed with the patient in detail including indication, risks, benefits, alternatives and complications.   Consent signed.      Pelvic Exam Findings:  Cervix normal. Uterus mobile with distortion of cervical canal by fibroids.     Procedure:  Speculum placed in the vagina.  Betadine wash of vagina and cervix.  Single tooth tenaculum was placed at the 12 o'clock position.  Uterus sound placed to endocervical canal distortion noted causing obstruction from further advancement into uterine cavity  Procedure discontinued.  Single tooth tenaculum removed.  Patient tolerated procedure well.    Visit Plan:  To schedule pre-operative appointment with my partner to discuss fibroid treatment and permanent contraception      Jess Garcia MD  1:01 PM  5/15/2024

## 2024-05-15 NOTE — PROGRESS NOTES
Charlie Arango is a 33 year old female.    HPI:     Chief Complaint   Patient presents with    Procedure     Paragard IUD insertion     Follow - Up     After usn done today       Here to discuss USN findings today and proceed with IUD insertion. Findings reviewed = enlarged volume of 8 cm uterus containing multiple intramural fibroids with 2 dominant measuring 1.1 and 1.5 cm causing uterine displacement to the right. Counseled that IUD placement may be limited by uterine architexture - wishes to proceed.    Medications (Active prior to today's visit):  No current outpatient medications on file.       Allergies:  No Known Allergies    /78   Ht 63\"   Wt 218 lb (98.9 kg)   LMP 05/07/2024 (Exact Date)   BMI 38.62 kg/m²     PHYSICAL EXAM:   GENERAL: Well developed, well nourished, in no apparent distress    GYNE/:   See IUD insertion note below - unable to place    EXTREMITIES: nontender without edema      ASSESSMENT/PLAN:   Assessment       1. Fibroids  - Insert IUD [36417] - procedure discontinued    2. Encounter for insertion of intrauterine contraceptive device (IUD) - discontinued  - Interested in Tubal Removal and possible fibroid treatment with [facility]  - Schedule pre-operative consult with my surgical partners]      Total time spent = 30 minutes (10 minutes: discontinued IUD insertion + 20 minutes: review of treatment plan)  >50% of visit = face to face discussion and coordination of care        5/15/2024  Jess Garcia MD

## 2024-05-17 ENCOUNTER — HOSPITAL ENCOUNTER (OUTPATIENT)
Age: 34
Discharge: HOME | End: 2024-05-17

## 2024-05-17 ENCOUNTER — NURSE TRIAGE (OUTPATIENT)
Dept: FAMILY MEDICINE CLINIC | Facility: CLINIC | Age: 34
End: 2024-05-17

## 2024-05-17 VITALS
RESPIRATION RATE: 18 BRPM | HEART RATE: 76 BPM | OXYGEN SATURATION: 96 % | DIASTOLIC BLOOD PRESSURE: 63 MMHG | TEMPERATURE: 98 F | SYSTOLIC BLOOD PRESSURE: 118 MMHG

## 2024-05-17 DIAGNOSIS — M67.432 GANGLION CYST OF DORSUM OF LEFT WRIST: Primary | ICD-10-CM

## 2024-05-17 PROCEDURE — 99213 OFFICE O/P EST LOW 20 MIN: CPT

## 2024-05-17 PROCEDURE — 99212 OFFICE O/P EST SF 10 MIN: CPT

## 2024-05-17 NOTE — ED INITIAL ASSESSMENT (HPI)
Patient arrived ambulatory to room c/o a small bump to the left hand/wrist. Patient states she noticed it today. Denies injury. Denies pain.

## 2024-05-17 NOTE — DISCHARGE INSTRUCTIONS
Wear the Velcro wrist splint for comfort throughout the day if you have pain you may take ibuprofen or Tylenol you may try icing the area.  There is a possibility that the ganglion cyst will get bigger if he gets bigger and it is very uncomfortable and painful follow-up with the hand wrist specialist.  If you develop redness or warmth of the skin fevers or chills pus or drainage from the skin this would be signs and symptoms of infection return to the [facility] or go to the nearest emergency department.

## 2024-05-17 NOTE — ED PROVIDER NOTES
Patient Seen in: [facility]      History     Chief Complaint   Patient presents with    Bump     Stated Complaint: Laceration/Abrasion - Cyst    Subjective:   HPI    This is a 33-year-old female who is right-hand dominant presenting with a bump over the left wrist.  Patient states that she has not had any injury or trauma but she noticed a bump over her wrist today.  Denies any pain denies any difficulty moving the wrist.  Denies any numbness or tingling in the extremity.    Objective:   Past Medical History:    Patient denies medical problems              Past Surgical History:   Procedure Laterality Date      10/15/2013                Social History     Socioeconomic History    Marital status: Single   Tobacco Use    Smoking status: Never    Smokeless tobacco: Never   Vaping Use    Vaping status: Never Used   Substance and Sexual Activity    Alcohol use: Yes     Comment: socially    Drug use: Never    Sexual activity: Yes     Partners: Male     Social Determinants of Health      Received from [facility], [facility]    Social Connections    Received from [facility], [facility]    Housing Stability              Review of Systems    Positive for stated complaint: Laceration/Abrasion - Cyst  Other systems are as noted in HPI.  Constitutional and vital signs reviewed.      All other systems reviewed and negative except as noted above.    Physical Exam     ED Triage Vitals [24 1708]   /63   Pulse 76   Resp 18   Temp 98.3 °F (36.8 °C)   Temp src Temporal   SpO2 96 %   O2 Device None (Room air)       Current Vitals:   Vital Signs  BP: 118/63  Pulse: 76  Resp: 18  Temp: 98.3 °F (36.8 °C)  Temp src: Temporal    Oxygen Therapy  SpO2: 96 %  O2 Device: None (Room air)            Physical Exam  Vitals and nursing note reviewed.   Constitutional:       Appearance: Normal appearance.   HENT:      Right Ear: External  ear normal.      Left Ear: External ear normal.      Nose: Nose normal.      Mouth/Throat:      Mouth: Mucous membranes are moist.      Pharynx: Oropharynx is clear.   Eyes:      Conjunctiva/sclera: Conjunctivae normal.   Musculoskeletal:         General: Normal range of motion.        Arms:       Cervical back: Normal range of motion.   Skin:     General: Skin is warm.      Capillary Refill: Capillary refill takes less than 2 seconds.   Neurological:      General: No focal deficit present.      Mental Status: She is alert and oriented to person, place, and time.               ED Course   Labs Reviewed - No data to display         MDM          Medical Decision Making  33-year-old female well-appearing and nontoxic presenting with a bump on the left wrist.  DDx ganglion cyst versus abscess versus cellulitis versus contusion versus sprain.  Given no injury or trauma no clinical indication for labs or imaging.  Physical exam seems consistent with a ganglion cyst.  Discussed the diagnosis with patient and family at bedside.  Discussed application of Velcro wrist splint for comfort ibuprofen or Tylenol if she gets any pain and icing the area.  Discussed that she develops pain or discomfort or any difficulties moving the wrist follow-up with the hand wrist specialist.  All education instructions placed in discharge paperwork.  Patient acknowledged understanding discharge instructions.    Procedure: Left upper extremity Velcro wrist splint applied pre and post application neurovascularly intact    Problems Addressed:  Ganglion cyst of dorsum of left wrist: acute illness or injury    Risk  OTC drugs.        Disposition and Plan     Clinical Impression:  1. Ganglion cyst of dorsum of left wrist         Disposition:  Discharge  5/17/2024  5:14 pm    Follow-up:  James Castle MD  [facility] [address]  [phone]      Hand/Wrist specialist to follow up with    Jeremie Lemos MD  [address] [place] [address]  [address] [place] [address]  [phone]                Medications Prescribed:  There are no discharge medications for this patient.

## 2024-05-17 NOTE — TELEPHONE ENCOUNTER
RN spoke with patient.     Patient complains of a swollen bump on her hand.     Patient denies redness or itching at this time. Patient denies fever. Patient says pain is 1-10.     Patient advised to proceed to [facility] for evaluation of bump.     Patient verbalized understanding and is agreeable to the plan.           Reason for Disposition   Swelling is painful to touch and no fever    Protocols used: Skin Lump or Localized Swelling-A-OH

## 2024-06-17 ENCOUNTER — OFFICE VISIT (OUTPATIENT)
Dept: OBGYN CLINIC | Facility: CLINIC | Age: 34
End: 2024-06-17
Payer: COMMERCIAL

## 2024-06-17 VITALS
WEIGHT: 215.69 LBS | SYSTOLIC BLOOD PRESSURE: 114 MMHG | BODY MASS INDEX: 38.22 KG/M2 | HEIGHT: 63 IN | DIASTOLIC BLOOD PRESSURE: 68 MMHG

## 2024-06-17 DIAGNOSIS — D25.1 FIBROIDS, INTRAMURAL: Primary | ICD-10-CM

## 2024-06-17 NOTE — PATIENT INSTRUCTIONS
Website for Acessa - laparoscopic radiofrequency ablation of fibroids:  [email or web address]

## 2024-06-17 NOTE — PROGRESS NOTES
New Patient GYN History and Physical  [facility]    CHIEF COMPLAINT:    Chief Complaint   Patient presents with    Consult      HISTORY OF PRESENT ILLNESS:   Charlie Arango is a 33 year old female   who presents for surgical consultation for fibroids.    Dr. Garcia was unable to insert Paragard IUD in the office, she had US showing multiple intramural fibroids and is ready to discuss surgery.    Is fairly certain she does not want any more pregnancies.         PAST MEDICAL HISTORY:   Past Medical History:    Patient denies medical problems        PAST SURGICAL HISTORY:   Past Surgical History:   Procedure Laterality Date      10/15/2013        PAST OB HISTORY:  OB History    Para Term  AB Living   2 1 1   1 1   SAB IAB Ectopic Multiple Live Births           1      # Outcome Date GA Lbr Tin/2nd Weight Sex Type Anes PTL Lv   2 Term 10/15/13   10 lb 6 oz (4.706 kg) M CS-Unspec EPI N ARIC   1 AB                CURRENT MEDICATIONS:    No current outpatient medications on file.    ALLERGIES:  No Known Allergies    SOCIAL HISTORY:  Social History     Socioeconomic History    Marital status: Single   Tobacco Use    Smoking status: Never    Smokeless tobacco: Never   Vaping Use    Vaping status: Never Used   Substance and Sexual Activity    Alcohol use: Yes     Comment: socially    Drug use: Never    Sexual activity: Yes     Partners: Male       FAMILY HISTORY:  Family History   Problem Relation Age of Onset    Hypertension Mother     High Blood Pressure Mother     Other (graves dis) Mother     Cancer Paternal Grandmother         ???    Colon Cancer Neg     Uterine Cancer Neg     Ovarian Cancer Neg     Breast Cancer Neg      ASSESSMENTS:  PHYSICAL EXAM:   Patient's last menstrual period was 2024 (exact date).   Vitals:    24 1338   BP: 114/68   Weight: 215 lb 11.2 oz (97.8 kg)   Height: 63\"     CONSTITUTIONAL: Awake, alert, cooperative, no apparent distress, and appears stated age    NECK: Supple, symmetrical, trachea midline, no adenopathy, thyroid symmetric, not enlarged and no tenderness  LUNGS: No excess work of breathing  MUSCULOSKELETAL: There is no redness, warmth, or swelling of the joints. Tone is normal.  NEUROLOGIC: Patient is awake, alert and oriented to name, place and time. Casual gait is normal.  SKIN: no bruising or bleeding and no rashes  PSYCHIATRIC: Behavior:  Appropriate  Mood:  appropriate    US Pelvis: 5/15/24  Uterus is normal sized with tilted position to right pelvis - uterine volume 110 cm with multiple intramural fibroids - two largest 1.0 + 1.5 cm.     Endometrium is 7 mm.     Both ovaries appear normal in size and echo-texture with follicles seen. FF noted in cul-de-sac.     ASSESSMENT AND PLAN:  1. Fibroids, intramural  - we reviewed US findings. Discussed myomectomy vs acessa. Risks reviewed.  - she is unsure if she wants to try the IUD insertion while in surgery, or if she wants tubal sterilization. I explained that salpingectomy is recommended due to decreased risk of ovarian cancer, but she might prefer BTL. Will think about surgery and let me know what she decides.  Follow up as needed  Total face to face time was 20 minutes, more than 50% of the time was spent in counseling and/or coordination of care related to above.  Sarah Colvin, DO

## 2025-02-06 ENCOUNTER — HOSPITAL ENCOUNTER (OUTPATIENT)
Age: 35
Discharge: HOME | End: 2025-02-06
Payer: COMMERCIAL

## 2025-02-06 VITALS
DIASTOLIC BLOOD PRESSURE: 77 MMHG | SYSTOLIC BLOOD PRESSURE: 122 MMHG | OXYGEN SATURATION: 100 % | TEMPERATURE: 97 F | HEART RATE: 92 BPM | RESPIRATION RATE: 18 BRPM

## 2025-02-06 DIAGNOSIS — Z32.01 POSITIVE PREGNANCY TEST (HCC): ICD-10-CM

## 2025-02-06 DIAGNOSIS — N30.01 ACUTE CYSTITIS WITH HEMATURIA: Primary | ICD-10-CM

## 2025-02-06 LAB
B-HCG UR QL: POSITIVE
BILIRUB UR QL STRIP: NEGATIVE
CLARITY UR: CLEAR
COLOR UR: YELLOW
GLUCOSE UR STRIP-MCNC: NEGATIVE MG/DL
KETONES UR STRIP-MCNC: NEGATIVE MG/DL
NITRITE UR QL STRIP: POSITIVE
PH UR STRIP: 5.5 [PH]
PROT UR STRIP-MCNC: NEGATIVE MG/DL
SP GR UR STRIP: <=1.005
UROBILINOGEN UR STRIP-ACNC: <2 MG/DL

## 2025-02-06 PROCEDURE — 99214 OFFICE O/P EST MOD 30 MIN: CPT

## 2025-02-06 PROCEDURE — 81002 URINALYSIS NONAUTO W/O SCOPE: CPT

## 2025-02-06 PROCEDURE — 87086 URINE CULTURE/COLONY COUNT: CPT | Performed by: NURSE PRACTITIONER

## 2025-02-06 PROCEDURE — 81025 URINE PREGNANCY TEST: CPT

## 2025-02-06 RX ORDER — CEPHALEXIN 500 MG/1
500 CAPSULE ORAL 2 TIMES DAILY
Qty: 14 CAPSULE | Refills: 0 | Status: SHIPPED | OUTPATIENT
Start: 2025-02-06 | End: 2025-02-13

## 2025-02-06 RX ORDER — PNV NO.95/FERROUS FUM/FOLIC AC 28MG-0.8MG
1 TABLET ORAL DAILY
Qty: 30 TABLET | Refills: 0 | Status: SHIPPED | OUTPATIENT
Start: 2025-02-06

## 2025-02-07 NOTE — ED PROVIDER NOTES
Patient Seen in: [facility]      History     Chief Complaint   Patient presents with    Urinary Symptoms     Entered by patient     Stated Complaint: Urinary Symptoms    Subjective:   HPI      This is a 34-year-old female with history of fibroid uterus presenting with urinary symptoms.  Patient states she has had urinary symptoms with suprapubic pressure for about 3 days.  Denies actual abdominal pain or back pain fever nausea or vomiting.  LMP 2025    Objective:     Past Medical History:    Fibroid uterus              Past Surgical History:   Procedure Laterality Date      10/15/2013                Social History     Socioeconomic History    Marital status: Single   Tobacco Use    Smoking status: Never    Smokeless tobacco: Never   Vaping Use    Vaping status: Never Used   Substance and Sexual Activity    Alcohol use: Yes     Comment: socially    Drug use: Never    Sexual activity: Yes     Partners: Male     Social Drivers of Health      Received from [facility], [facility]    Housing Stability              Review of Systems    Positive for stated complaint: Urinary Symptoms  Other systems are as noted in HPI.  Constitutional and vital signs reviewed.      All other systems reviewed and negative except as noted above.    Physical Exam     ED Triage Vitals [25 1838]   /77   Pulse 92   Resp 18   Temp 97 °F (36.1 °C)   Temp src Oral   SpO2 100 %   O2 Device None (Room air)       Current Vitals:   Vital Signs  BP: 122/77  Pulse: 92  Resp: 18  Temp: 97 °F (36.1 °C)  Temp src: Oral    Oxygen Therapy  SpO2: 100 %  O2 Device: None (Room air)        Physical Exam  Vitals and nursing note reviewed.   Constitutional:       Appearance: Normal appearance.   HENT:      Right Ear: External ear normal.      Left Ear: External ear normal.      Nose: Nose normal.      Mouth/Throat:      Mouth: Mucous membranes are moist.      Pharynx: Oropharynx is  clear.   Eyes:      Conjunctiva/sclera: Conjunctivae normal.   Cardiovascular:      Rate and Rhythm: Normal rate.   Pulmonary:      Effort: Pulmonary effort is normal.   Abdominal:      Palpations: Abdomen is soft.      Tenderness: There is no abdominal tenderness. There is no right CVA tenderness or left CVA tenderness.   Musculoskeletal:         General: Normal range of motion.      Cervical back: Normal range of motion.   Skin:     General: Skin is warm.      Capillary Refill: Capillary refill takes less than 2 seconds.   Neurological:      General: No focal deficit present.      Mental Status: She is alert and oriented to person, place, and time.             ED Course     Labs Reviewed   POCT PREGNANCY URINE - Abnormal; Notable for the following components:       Result Value    POCT Urine Pregnancy Positive (*)     All other components within normal limits   [facility] POCT URINALYSIS DIPSTICK - Abnormal; Notable for the following components:    Blood, Urine Trace-Intact (*)     Nitrite Urine Positive (*)     Leukocyte esterase urine Trace (*)     All other components within normal limits   URINE CULTURE, ROUTINE                   MDM         Medical Decision Making  34-year-old female well-appearing nontoxic no abdominal tenderness no CVA tenderness with urinary symptoms for 3 days.  DDx dysuria versus UTI versus interstitial Pleasants versus pyelonephritis versus nephrolithiasis given no abdominal tenderness or CVA tenderness no clinical indication for labs or imaging urine dip UCG and urine culture will be ordered    UCG positive urine dip notes blood leukocytes and nitrites consistent with a UTI urine culture will be sent out.    Discussed results with the patient discussed positive pregnancy test discussed treatment for UTI with Keflex.  After telling patient about pregnancy she states that earlier today she did have an abdominal cramp but it only lasted a few minutes and then it went away denies any abdominal  cramping now.  After discussing this patient with my attending Dr. Ellington will give patient strict abdominal pain and ER precautions with any new or worsening symptoms as she is nontender and she has no abdominal cramping or vaginal bleeding at this time.  Discussed if she develops any abdominal pain back pain vaginal bleeding fever nausea or vomiting go directly to the emergency department.  Discussed calling OB/GYN to set up an appointment for follow-up.  Patient will also be prescribed prenatal vitamins.  Patient is agreeable with the plan of care and acknowledges understanding discharge instructions.        Problems Addressed:  Acute cystitis with hematuria: acute illness or injury  Positive pregnancy test (HCC): acute illness or injury    Amount and/or Complexity of Data Reviewed  Labs: ordered. Decision-making details documented in ED Course.  Discussion of management or test interpretation with external provider(s): This patient was discussed with my attending Dr. Ellington who agrees with this provider's management and plan of care.    Risk  Prescription drug management.        Disposition and Plan     Clinical Impression:  1. Acute cystitis with hematuria    2. Positive pregnancy test (HCC)         Disposition:  Discharge  2/6/2025  6:53 pm    Follow-up:    Follow-up with your gynecologist call tomorrow to set up an appointment take the first available appointment              Medications Prescribed:  Discharge Medication List as of 2/6/2025  6:56 PM        START taking these medications    Details   cephALEXin 500 MG Oral Cap Take 1 capsule (500 mg total) by mouth 2 (two) times daily for 7 days., Normal, Disp-14 capsule, R-0      Prenatal Vit-Fe Fumarate-FA (PRENATAL VITAMINS) 28-0.8 MG Oral Tab Take 1 tablet by mouth daily., Normal, Disp-30 tablet, R-0                 Supplementary Documentation:

## 2025-02-07 NOTE — DISCHARGE INSTRUCTIONS
You may start the prenatal vitamins as your pregnancy test was positive.  Start the antibiotic for the urine infection eat yogurt as antibiotics can cause upset stomach diarrhea and a yeast infection drink plenty of water urinate whenever you have the urge call to set up an appointment with your gynecologist take the first available appointment.  If you develop severe abdominal pain or cramping vaginal bleeding a fever back pain nausea or vomiting or any new or worsening symptoms go to the nearest emergency department.

## 2025-02-10 ENCOUNTER — OFFICE VISIT (OUTPATIENT)
Dept: OBGYN CLINIC | Facility: CLINIC | Age: 35
End: 2025-02-10
Payer: COMMERCIAL

## 2025-02-10 VITALS
BODY MASS INDEX: 39.93 KG/M2 | HEIGHT: 63 IN | SYSTOLIC BLOOD PRESSURE: 108 MMHG | HEART RATE: 82 BPM | WEIGHT: 225.38 LBS | DIASTOLIC BLOOD PRESSURE: 73 MMHG

## 2025-02-10 DIAGNOSIS — Z34.90: ICD-10-CM

## 2025-02-10 DIAGNOSIS — R10.2 PELVIC CRAMPING: ICD-10-CM

## 2025-02-10 DIAGNOSIS — N92.6 MISSED MENSES: Primary | ICD-10-CM

## 2025-02-10 DIAGNOSIS — Z11.3 SCREENING EXAMINATION FOR STI: ICD-10-CM

## 2025-02-10 PROCEDURE — 3074F SYST BP LT 130 MM HG: CPT | Performed by: ADVANCED PRACTICE MIDWIFE

## 2025-02-10 PROCEDURE — 99213 OFFICE O/P EST LOW 20 MIN: CPT | Performed by: ADVANCED PRACTICE MIDWIFE

## 2025-02-10 PROCEDURE — 3078F DIAST BP <80 MM HG: CPT | Performed by: ADVANCED PRACTICE MIDWIFE

## 2025-02-10 PROCEDURE — 3008F BODY MASS INDEX DOCD: CPT | Performed by: ADVANCED PRACTICE MIDWIFE

## 2025-02-11 ENCOUNTER — TELEPHONE (OUTPATIENT)
Dept: OBGYN CLINIC | Facility: CLINIC | Age: 35
End: 2025-02-11

## 2025-02-11 LAB
BV BACTERIA DNA VAG QL NAA+PROBE: POSITIVE
C GLABRATA DNA VAG QL NAA+PROBE: NEGATIVE
C KRUSEI DNA VAG QL NAA+PROBE: NEGATIVE
C TRACH DNA SPEC QL NAA+PROBE: NEGATIVE
CANDIDA DNA VAG QL NAA+PROBE: NEGATIVE
N GONORRHOEA DNA SPEC QL NAA+PROBE: NEGATIVE
T VAGINALIS DNA VAG QL NAA+PROBE: NEGATIVE

## 2025-02-11 RX ORDER — METRONIDAZOLE 500 MG/1
500 TABLET ORAL 2 TIMES DAILY
Qty: 14 TABLET | Refills: 0 | Status: SHIPPED | OUTPATIENT
Start: 2025-02-11 | End: 2025-02-25

## 2025-02-11 NOTE — PATIENT INSTRUCTIONS
[place]  Providers    [facility]  [address] [place] [address] [phone]    [facility]  [address], [place] [address] [phone]    [facility]  [address] [place] [address]  [phone]    [facility] (medical and surgical abortions up to 17 weeks)  [address] [place], [place]   [phone]    [facility] (medical and surgical abortions up to 23.6 weeks)  [address] [place] [address]  [phone]    [address] [place] [address]  [phone] [address] [place] [address]  [phone]    [facility] (medical and surgical abortions up to 12 weeks)  [address] [place] [address]  [phone]    National  [employer] Hotline: 1-788.335.6569    [facility] (medical and surgical abortions up to 18 weeks)  [facility]  [address] [place] [address]  [phone]    [facility]  [address] [place] [address]  [phone]    [facility]  [address] [place] [address]  [phone]    [facility]  [address] [place] [address]  [phone]    [facility]   [address] [place], [place]   [phone]    [facility]  [address] [place] [address]  [phone]    [facility]  [address] [place] [address]  [phone]    [facility]  [address] [place] [address]  [phone]    [facility]  [address] [place] [address]  [phone]    [facility]  [address] [place] [address]  [phone]    [facility] (medical and surgical abortions up to 14 weeks)  [facility]  [address], [address] [place] [address]  [phone]        Please call individual agencies for further information regarding cost and possible financial assistance.  If additional counseling is desired, these services are available at the [facility].

## 2025-02-11 NOTE — PROGRESS NOTES
Subjective:   Patient ID: Charlie Arango is a 34 year old female.    Charlie presents for missed menses. Reports went to urgent care for lower abdominal discomfort and debbie a positive pregnancy test there. This is an unexpected pregnancy. She is unsure how she wants to proceed but is leaning towards termination. LMP 1/9/25. GA by LMP 4.4wks    She also reports frequent urination, though was negative for UTI in urgent care. Has history of BV.        History/Other:   Review of Systems   All other systems reviewed and are negative.    Current Outpatient Medications   Medication Sig Dispense Refill    cephALEXin 500 MG Oral Cap Take 1 capsule (500 mg total) by mouth 2 (two) times daily for 7 days. 14 capsule 0    Prenatal Vit-Fe Fumarate-FA (PRENATAL VITAMINS) 28-0.8 MG Oral Tab Take 1 tablet by mouth daily. 30 tablet 0    metroNIDAZOLE 500 MG Oral Tab Take 1 tablet (500 mg total) by mouth 2 (two) times daily for 14 days. 14 tablet 0     Allergies:Allergies[1]    Objective:   Physical Exam  Vitals and nursing note reviewed.   Constitutional:       General: She is not in acute distress.     Appearance: Normal appearance. She is obese. She is not ill-appearing, toxic-appearing or diaphoretic.   Pulmonary:      Effort: Pulmonary effort is normal.   Genitourinary:     General: Normal vulva.      Labia:         Right: No rash, tenderness, lesion or injury.         Left: No rash, tenderness, lesion or injury.       Vagina: No signs of injury and foreign body. Vaginal discharge (thin, white discharge present) present. No erythema, tenderness, bleeding, lesions or prolapsed vaginal walls.      Cervix: No cervical motion tenderness, discharge, friability, lesion, erythema, cervical bleeding or eversion.      Uterus: Not deviated, not fixed, not tender and no uterine prolapse.       Adnexa:         Right: No mass, tenderness or fullness.          Left: No mass, tenderness or fullness.     Neurological:      Mental Status: She is  alert and oriented to person, place, and time.   Psychiatric:         Mood and Affect: Mood normal.         Behavior: Behavior normal.         Thought Content: Thought content normal.         Judgment: Judgment normal.         Assessment & Plan:   1. Missed menses    2. Screening examination for STI    3. Pelvic cramping    4. Unplanned pregnancy, unknown if wanted (HCC)        Orders Placed This Encounter   Procedures    Chlamydia/GC PCR Combo    Vaginitis Vaginosis PCR Panel       Meds This Visit:  Requested Prescriptions      No prescriptions requested or ordered in this encounter       Imaging & Referrals:  None    Discussed pregnancy options. Discussed medical versus surgical termination. Patient is leaning towards medical termination and is considering [facility]. Informed her that this is an option until she is 9 weeks.    Vaginal culture and GC/CT collected and sent due to symptoms.     Reviewed warning signs, including increased pelvic pain (delano bilateral) and bleeding.    Return to care for PNC if decides to continue pregnancy.     30 minutes spent reviewing chart, counseling and examining patient, and charting       [1] No Known Allergies

## 2025-03-25 ENCOUNTER — HOSPITAL ENCOUNTER (OUTPATIENT)
Age: 35
Discharge: HOME | End: 2025-03-25
Payer: COMMERCIAL

## 2025-03-25 VITALS
OXYGEN SATURATION: 100 % | HEART RATE: 76 BPM | SYSTOLIC BLOOD PRESSURE: 120 MMHG | RESPIRATION RATE: 16 BRPM | DIASTOLIC BLOOD PRESSURE: 85 MMHG | TEMPERATURE: 98 F

## 2025-03-25 DIAGNOSIS — H10.32 ACUTE BACTERIAL CONJUNCTIVITIS OF LEFT EYE: Primary | ICD-10-CM

## 2025-03-25 PROCEDURE — 99213 OFFICE O/P EST LOW 20 MIN: CPT | Performed by: NURSE PRACTITIONER

## 2025-03-25 RX ORDER — CIPROFLOXACIN HYDROCHLORIDE 3.5 MG/ML
1 SOLUTION/ DROPS TOPICAL EVERY 6 HOURS
Qty: 10 ML | Refills: 0 | Status: SHIPPED | OUTPATIENT
Start: 2025-03-25 | End: 2025-04-01

## 2025-03-26 NOTE — ED INITIAL ASSESSMENT (HPI)
Patient reports earlier today she felt like something was in her eye and eye is now irritated and red. States she no longer feels like something is in her eye

## 2025-03-26 NOTE — ED PROVIDER NOTES
Patient Seen in: [facility]      History     Chief Complaint   Patient presents with    Eye Problem     Pink eye - Entered by patient     Stated Complaint: Eye Problem - Pink eye    Subjective:   35 y/o female 10 weeks pregnant with unremarkable medical history presents with c/o left eye itching, redness and drainage onset this morning. Has not worn contact recently. Endorses recent uri symptoms and was around sick children recently. No pain, visual changes, photophobia              Objective:     Past Medical History:    Fibroid uterus              Past Surgical History:   Procedure Laterality Date      10/15/2013                Social History     Socioeconomic History    Marital status: Single   Tobacco Use    Smoking status: Never    Smokeless tobacco: Never   Vaping Use    Vaping status: Never Used   Substance and Sexual Activity    Alcohol use: Yes     Comment: socially    Drug use: Never    Sexual activity: Yes     Partners: Male     Social Drivers of Health      [facility]    Housing Stability              Review of Systems   Constitutional:  Negative for chills and fever.   HENT:  Positive for congestion.    Eyes:  Positive for discharge, redness and itching. Negative for photophobia, pain and visual disturbance.   Neurological:  Negative for dizziness, light-headedness and headaches.   All other systems reviewed and are negative.      Positive for stated complaint: Eye Problem - Pink eye  Other systems are as noted in HPI.  Constitutional and vital signs reviewed.      All other systems reviewed and negative except as noted above.    Physical Exam     ED Triage Vitals [25 1904]   /85   Pulse 76   Resp 16   Temp 98.3 °F (36.8 °C)   Temp src Oral   SpO2 100 %   O2 Device None (Room air)       Current Vitals:   Vital Signs  BP: 120/85  Pulse: 76  Resp: 16  Temp: 98.3 °F (36.8 °C)  Temp src: Oral    Oxygen  Therapy  SpO2: 100 %  O2 Device: None (Room air)        Physical Exam  Vitals and nursing note reviewed.   Constitutional:       General: She is not in acute distress.     Appearance: Normal appearance. She is not ill-appearing.   HENT:      Head: Normocephalic.      Mouth/Throat:      Mouth: Mucous membranes are moist.   Eyes:      General: Lids are normal. Vision grossly intact.         Left eye: Discharge present.     Extraocular Movements: Extraocular movements intact.      Conjunctiva/sclera:      Left eye: Left conjunctiva is injected.      Pupils: Pupils are equal, round, and reactive to light.      Comments: Thick, yellow drainage to left eye with matting to lashes    Cardiovascular:      Rate and Rhythm: Normal rate and regular rhythm.   Pulmonary:      Effort: Pulmonary effort is normal.   Musculoskeletal:         General: Normal range of motion.   Skin:     General: Skin is warm and dry.   Neurological:      Mental Status: She is alert and oriented to person, place, and time.   Psychiatric:         Behavior: Behavior is cooperative.             ED Course   Labs Reviewed - No data to display                MDM              Medical Decision Making  Patient is well-appearing.  I discussed differentials with patient including but not limited to viral vs allergic vs bacterial conjunctivitis  Clean lashes with warm towel, good hand washing. Do not wear contacts until completion of antibiotic drops  Rx ciprofloxacin eye drops  Fu with PCP. Return/ ED precautions discussed      Problems Addressed:  Acute bacterial conjunctivitis of left eye: acute illness or injury    Risk  Prescription drug management.        Disposition and Plan     Clinical Impression:  1. Acute bacterial conjunctivitis of left eye         Disposition:  Discharge  3/25/2025  7:18 pm    Follow-up:  Jeremie Lemos MD  [address] [place] [id number]  [phone]    Schedule an appointment as soon as possible for a visit              Medications Prescribed:  Discharge Medication List as of 3/25/2025  7:21 PM        START taking these medications    Details   ciprofloxacin 0.3 % Ophthalmic Solution Place 1 drop into the left eye every 6 (six) hours for 7 days., Normal, Disp-10 mL, R-0                 Supplementary Documentation:

## 2025-05-03 PROBLEM — T30.0 SCALD BURN: Status: ACTIVE | Noted: 2024-01-30

## 2025-05-03 PROBLEM — T24.211A PARTIAL THICKNESS BURN OF RIGHT THIGH: Status: ACTIVE | Noted: 2024-01-30

## 2025-05-03 PROBLEM — L91.0 HYPERTROPHIC SCAR: Status: ACTIVE | Noted: 2025-05-03

## 2025-05-03 RX ORDER — ETONOGESTREL/ETHINYL ESTRADIOL .12-.015MG
RING, VAGINAL VAGINAL
COMMUNITY
Start: 2025-02-24

## 2025-05-03 RX ORDER — PROMETHAZINE HYDROCHLORIDE 25 MG/1
TABLET ORAL
COMMUNITY
Start: 2025-02-21

## 2025-05-03 RX ORDER — IBUPROFEN 800 MG/1
TABLET, FILM COATED ORAL
COMMUNITY
Start: 2025-02-21

## 2025-05-05 ENCOUNTER — OFFICE VISIT (OUTPATIENT)
Dept: INTERNAL MEDICINE CLINIC | Facility: CLINIC | Age: 35
End: 2025-05-05

## 2025-05-05 VITALS
BODY MASS INDEX: 39.34 KG/M2 | WEIGHT: 222 LBS | HEART RATE: 97 BPM | OXYGEN SATURATION: 100 % | TEMPERATURE: 98 F | DIASTOLIC BLOOD PRESSURE: 76 MMHG | SYSTOLIC BLOOD PRESSURE: 120 MMHG | HEIGHT: 63 IN

## 2025-05-05 DIAGNOSIS — R10.9 ABDOMINAL CRAMPING: Primary | ICD-10-CM

## 2025-05-05 DIAGNOSIS — Z98.890 STATUS POST ELECTIVE ABORTION: ICD-10-CM

## 2025-05-05 LAB
CONTROL LINE PRESENT WITH A CLEAR BACKGROUND (YES/NO): YES YES/NO
KIT LOT #: ABNORMAL NUMERIC
PREGNANCY TEST, URINE: POSITIVE

## 2025-05-05 PROCEDURE — 3008F BODY MASS INDEX DOCD: CPT | Performed by: NURSE PRACTITIONER

## 2025-05-05 PROCEDURE — 81025 URINE PREGNANCY TEST: CPT | Performed by: NURSE PRACTITIONER

## 2025-05-05 PROCEDURE — 99203 OFFICE O/P NEW LOW 30 MIN: CPT | Performed by: NURSE PRACTITIONER

## 2025-05-05 PROCEDURE — 3074F SYST BP LT 130 MM HG: CPT | Performed by: NURSE PRACTITIONER

## 2025-05-05 PROCEDURE — 3078F DIAST BP <80 MM HG: CPT | Performed by: NURSE PRACTITIONER

## 2025-05-05 RX ORDER — CIPROFLOXACIN HYDROCHLORIDE 3.5 MG/ML
SOLUTION/ DROPS TOPICAL
COMMUNITY
Start: 2025-03-25

## 2025-05-05 NOTE — PROGRESS NOTES
Subjective:   Charlie Arango is a 34 year old female with PMH HL, fibroids who presents for evaluation of intermittent pulsations in her lower abdomen.    Has been occurring intermittently for the past week and a half  Not at all painful  Occurs primarily in her suprapubic area but sometimes feels it on the left lower abdomen.  States it feels like fetal movement  LMP ended 4/14. Reports regular periods.  Has a nuvaring but hasn't inserted this month, birth control method is withdrawal currently  Has 1 child - 10yo  No pain, fever, v/d  Mild nausea but that happens to her sometimes.    Ordered preg test in office - positive    After bringing the test in to the patient's attention, she reports that she did have an elective medical ab at approx 6 weeks gestation early April of this year through [facility].  Reports that she had a lot of bleeding and thought she passed everything.    History/Other:    No Further Nursing Notes to Review  Tobacco Reviewed  Allergies   Reviewed  Medications Reviewed  Problem List Reviewed  Medical History   Reviewed  Surgical History Reviewed  OB Status Reviewed  Family History   Reviewed  Social History Reviewed         Tobacco:  She has never smoked tobacco.    Current Medications[1]      Review of Systems:  Review of Systems  10 point review of systems otherwise negative with the exception of HPI and assessment and plan.    Objective:   /76   Pulse 97   Temp 97.8 °F (36.6 °C) (Temporal)   Ht 5' 3\" (1.6 m)   Wt 222 lb (100.7 kg)   LMP 04/14/2025 (Approximate)   SpO2 100%   BMI 39.33 kg/m²  Estimated body mass index is 39.33 kg/m² as calculated from the following:    Height as of this encounter: 5' 3\" (1.6 m).    Weight as of this encounter: 222 lb (100.7 kg).      Physical Exam  Vitals reviewed.   Cardiovascular:      Rate and Rhythm: Normal rate.   Pulmonary:      Effort: Pulmonary effort is normal. No respiratory distress.   Abdominal:      General: Bowel  sounds are normal.      Palpations: Abdomen is soft.      Tenderness: There is no abdominal tenderness.      Comments: No pulsations felt   Neurological:      Mental Status: She is alert.         Assessment & Plan:   1. Abdominal cramping (Primary)  -     Urine Pregnancy Test  -     US PELVIS (TRANSVAGINAL ONLY) (CPT=76830); Future; Expected date: 2025  -     Midwife Referral - [facility])  - Unclear etiology of these pulsations - possibly uterine contractions post elective ab vs gas bubbles. No risk factors for AAA. Will obtain TVUS ASAP to r/o retained POC - pt to call today to schedule. [facility]   - To ER with pulsations accompanied by new pain, vaginal bleeding, fevers/chills/body aches. Verbalized understanding.  2. Status post elective   -     Urine Pregnancy Test  -     US PELVIS (TRANSVAGINAL ONLY) (CPT=76830); Future; Expected date: 2025  -     Midwife Referral - [facility])  - As above.        Return if symptoms worsen or fail to improve.    CONNIE Meza, 2025, 5:08 PM     This note was prepared using Dragon Medical voice recognition dictation software. As a result errors may occur. When identified, these errors have been corrected. While every attempt is made to correct errors during dictation discrepancies may still exist.       [1]   Current Outpatient Medications   Medication Sig Dispense Refill    ciprofloxacin 0.3 % Ophthalmic Solution INSTILL 1 DROP IN LEFT EYE EVERY 6 HOURS FOR 7 DAYS (Patient not taking: Reported on 2025)      NUVARING 0.12-0.015 MG/24HR Vaginal Ring  (Patient not taking: Reported on 2025)      ibuprofen 800 MG Oral Tab  (Patient not taking: Reported on 2025)      promethazine 25 MG Oral Tab TAKE 1 TABLET BY MOUTH 30 MINUTES PRIOR TO USING MISOPROSTOL REPEAT EVERY 6 HOURS AS NEEDED FOR NAUSEA OR VOMITING (Patient not taking: Reported on 2025)      Prenatal Vit-Fe  Fumarate-FA (PRENATAL VITAMINS) 28-0.8 MG Oral Tab Take 1 tablet by mouth daily. (Patient not taking: Reported on 5/5/2025) 30 tablet 0

## 2025-05-05 NOTE — PATIENT INSTRUCTIONS
Please schedule the ultrasound and we'll evaluate for any retained products of conception  To ER if you're having any new pain/cramping/bleeding/fevers/chills

## 2025-05-06 ENCOUNTER — HOSPITAL ENCOUNTER (OUTPATIENT)
Dept: ULTRASOUND IMAGING | Facility: HOSPITAL | Age: 35
Discharge: HOME | End: 2025-05-06
Attending: Nurse Practitioner
Payer: COMMERCIAL

## 2025-05-06 DIAGNOSIS — R10.9 ABDOMINAL CRAMPING: ICD-10-CM

## 2025-05-06 DIAGNOSIS — Z98.890 STATUS POST ELECTIVE ABORTION: ICD-10-CM

## 2025-05-06 PROCEDURE — 76801 OB US < 14 WKS SINGLE FETUS: CPT | Performed by: NURSE PRACTITIONER

## 2025-05-07 ENCOUNTER — LAB ENCOUNTER (OUTPATIENT)
Dept: LAB | Facility: HOSPITAL | Age: 35
End: 2025-05-07
Attending: ADVANCED PRACTICE MIDWIFE
Payer: COMMERCIAL

## 2025-05-07 ENCOUNTER — OFFICE VISIT (OUTPATIENT)
Dept: OBGYN CLINIC | Facility: CLINIC | Age: 35
End: 2025-05-07

## 2025-05-07 ENCOUNTER — TELEPHONE (OUTPATIENT)
Dept: OBGYN CLINIC | Facility: CLINIC | Age: 35
End: 2025-05-07

## 2025-05-07 VITALS
WEIGHT: 220 LBS | BODY MASS INDEX: 38.98 KG/M2 | DIASTOLIC BLOOD PRESSURE: 79 MMHG | HEART RATE: 94 BPM | HEIGHT: 63 IN | SYSTOLIC BLOOD PRESSURE: 111 MMHG

## 2025-05-07 DIAGNOSIS — O03.4 RETAINED PRODUCTS OF CONCEPTION FOLLOWING ABORTION (HCC): ICD-10-CM

## 2025-05-07 DIAGNOSIS — R93.89 ABNORMAL ULTRASOUND OF PELVIS: Primary | ICD-10-CM

## 2025-05-07 DIAGNOSIS — R93.89 ABNORMAL ULTRASOUND OF PELVIS: ICD-10-CM

## 2025-05-07 LAB — B-HCG SERPL-ACNC: 760 MIU/ML (ref ?–4.2)

## 2025-05-07 PROCEDURE — 3074F SYST BP LT 130 MM HG: CPT | Performed by: ADVANCED PRACTICE MIDWIFE

## 2025-05-07 PROCEDURE — 3008F BODY MASS INDEX DOCD: CPT | Performed by: ADVANCED PRACTICE MIDWIFE

## 2025-05-07 PROCEDURE — 3078F DIAST BP <80 MM HG: CPT | Performed by: ADVANCED PRACTICE MIDWIFE

## 2025-05-07 PROCEDURE — 99213 OFFICE O/P EST LOW 20 MIN: CPT | Performed by: ADVANCED PRACTICE MIDWIFE

## 2025-05-07 PROCEDURE — 84702 CHORIONIC GONADOTROPIN TEST: CPT | Performed by: ADVANCED PRACTICE MIDWIFE

## 2025-05-07 NOTE — TELEPHONE ENCOUNTER
Name and  verified. Patient requesting an appointment to discuss most recent ultrasound. Per her APRN- they would like her to follow-up with her OB provider. Appointment scheduled today with Marilyn at 6:00 pm at the [facility]. All questions answered.

## 2025-05-07 NOTE — PROGRESS NOTES
Chief Complaint   Patient presents with    Gyn Exam     Pt is present to have an ultrasound         HPI:   Charlie is 34 year old , here today to follow-up on pelvic ultrasound results ordered by another provider. Was referred to OB/Gyn/midwife because of abnormal results. Pt reports that she had a medical termination of pregnancy in early April. Reports that she took the initial pills and the follow-up pills and then she bleed heavily with clots for 3 weeks. Stopped bleeding on 25. Has had unprotected sex since the termination. Had planned to start the NuvaRing but hasn't started it yet.   Has known fibroids.     The patient's medical, surgical, family, social, and medication histories have been reviewed. See respective tabs for documentation.     Note: This is a gyn only visit. Pt has PCP and is referred back to PCP for care of any non-gyn concerns listed above in the Problem List.    ROS:  Review of Systems   Constitutional: Negative.    Genitourinary:  Positive for menstrual problem (dysmenorrhea and fibroids).       PHYSICAL EXAM:  Vitals:    25 1745   BP: 111/79   Pulse: 94     Physical Exam  Vitals reviewed.   Constitutional:       Appearance: Normal appearance.   HENT:      Head: Normocephalic.   Pulmonary:      Effort: Pulmonary effort is normal.   Neurological:      Mental Status: She is alert and oriented to person, place, and time.   Psychiatric:         Behavior: Behavior normal.         Thought Content: Thought content normal.         Judgment: Judgment normal.          Last pap: 2023 NILM/HPV negative  Last mammogram: N/A    25 office pregnancy test was positive    25 pelvic ultrasound:   FINDINGS:      1. There is abnormal thickening and heterogeneity of the endometrium which measures up to 4 cm.  There is increased vascular flow as well as 2 small cystic regions within the endometrium.  Given the patient's history, the findings are most compatible   with retained products  of conception      2. The uterus is minimally enlarged in size measuring approximately 9 x 6.6 x 5.2 cm.  There is a degenerating fibroid identified within the lower uterine segment measuring 2.1 x 1.6 x 1.8 cm. Additionally, there is a small cystic heterogeneous lesion   within the posterior uterine fundus measuring 1.1 x 1.0 cm. This also likely represents a degenerating fibroid.      3. There is a moderate sized heterogeneous aavascular collection/structure within the cul-de-sac measuring approximately 6.3 x 3.9 x 2.2 cm.  This may represent a pelvic hematoma.  There is a small amount of surrounding simple appearing free fluid.      4. The bilateral ovaries are normal in size, position, and echogenicity.  Both ovaries demonstrate normal blood flow.  The right ovary measures 2.5 x 1.5 x 2.5 cm and contains a few simple follicles.  The left ovary measures 2.9 x 1.7 x 2.5 cm. There are    no abnormal structures within the bilateral ovaries.   No results found for this or any previous visit (from the past 24 hours).      ASSESSMENT/PLAN:     Charlie was seen today for gyn exam.    Diagnoses and all orders for this visit:    Abnormal ultrasound of pelvis  -     HCG, Beta Subunit (Quant Pregnancy Test); Standing    Retained products of conception following  (HCC)  -     HCG, Beta Subunit (Quant Pregnancy Test); Standing         Follow-up/Return to clinic: PRN pending today's and Friday's bHCG result.     Counseling:   Likely retained products of conception but needs confirmation that it is not a new pregnancy  May need D&C pending lab results  Advised to repeat bHCG 2 days after initial draw. To go to lab this evening for initial draw    I have spent 20 minutes total time on the day of the encounter, including: preparing to see the patient, ordering/reviewing labs, performing a medically appropriate exam, and providing care coordination. face to face counseling, chart review, orders and coordination of  care    Patient verbalized understanding, All questions answered. No barriers to learning identified

## 2025-05-09 ENCOUNTER — LAB ENCOUNTER (OUTPATIENT)
Dept: LAB | Facility: HOSPITAL | Age: 35
End: 2025-05-09
Attending: ADVANCED PRACTICE MIDWIFE
Payer: COMMERCIAL

## 2025-05-09 ENCOUNTER — MOBILE ENCOUNTER (OUTPATIENT)
Dept: OBGYN CLINIC | Facility: CLINIC | Age: 35
End: 2025-05-09

## 2025-05-09 DIAGNOSIS — O03.4 RETAINED PRODUCTS OF CONCEPTION FOLLOWING ABORTION (HCC): ICD-10-CM

## 2025-05-09 DIAGNOSIS — R93.89 ABNORMAL ULTRASOUND OF PELVIS: ICD-10-CM

## 2025-05-09 LAB — B-HCG SERPL-ACNC: 1515.1 MIU/ML (ref ?–4.2)

## 2025-05-09 PROCEDURE — 84702 CHORIONIC GONADOTROPIN TEST: CPT | Performed by: ADVANCED PRACTICE MIDWIFE

## 2025-05-10 NOTE — PROGRESS NOTES
Received page from pt. When called her back call went right to voicemail. Left message for pt to page again.

## 2025-05-15 ENCOUNTER — HOSPITAL ENCOUNTER (OUTPATIENT)
Dept: ULTRASOUND IMAGING | Facility: HOSPITAL | Age: 35
Discharge: HOME | End: 2025-05-15
Attending: ADVANCED PRACTICE MIDWIFE
Payer: COMMERCIAL

## 2025-05-15 DIAGNOSIS — O36.80X0 PREGNANCY WITH UNCERTAIN FETAL VIABILITY, SINGLE OR UNSPECIFIED FETUS (HCC): ICD-10-CM

## 2025-05-15 PROCEDURE — 76801 OB US < 14 WKS SINGLE FETUS: CPT | Performed by: ADVANCED PRACTICE MIDWIFE

## 2025-05-23 ENCOUNTER — MOBILE ENCOUNTER (OUTPATIENT)
Dept: OBGYN CLINIC | Facility: CLINIC | Age: 35
End: 2025-05-23

## 2025-05-23 NOTE — PROGRESS NOTES
Attempted to reach pt again by phone regarding recent ultrasound results and need for repeat bHCG and ultrasound. No answer again. Left another voicemail. Will try her again.

## 2025-05-26 ENCOUNTER — HOSPITAL ENCOUNTER (EMERGENCY)
Facility: HOSPITAL | Age: 35
Discharge: HOME | End: 2025-05-26
Attending: STUDENT IN AN ORGANIZED HEALTH CARE EDUCATION/TRAINING PROGRAM
Payer: COMMERCIAL

## 2025-05-26 VITALS
HEART RATE: 68 BPM | TEMPERATURE: 98 F | OXYGEN SATURATION: 100 % | RESPIRATION RATE: 17 BRPM | DIASTOLIC BLOOD PRESSURE: 78 MMHG | SYSTOLIC BLOOD PRESSURE: 118 MMHG

## 2025-05-26 DIAGNOSIS — G44.209 TENSION HEADACHE: Primary | ICD-10-CM

## 2025-05-26 PROCEDURE — 96361 HYDRATE IV INFUSION ADD-ON: CPT

## 2025-05-26 PROCEDURE — 99284 EMERGENCY DEPT VISIT MOD MDM: CPT

## 2025-05-26 PROCEDURE — 96375 TX/PRO/DX INJ NEW DRUG ADDON: CPT

## 2025-05-26 PROCEDURE — 96374 THER/PROPH/DIAG INJ IV PUSH: CPT

## 2025-05-26 RX ORDER — KETOROLAC TROMETHAMINE 15 MG/ML
15 INJECTION, SOLUTION INTRAMUSCULAR; INTRAVENOUS ONCE
Status: COMPLETED | OUTPATIENT
Start: 2025-05-26 | End: 2025-05-26

## 2025-05-26 RX ORDER — PROCHLORPERAZINE EDISYLATE 5 MG/ML
10 INJECTION INTRAMUSCULAR; INTRAVENOUS ONCE
Status: COMPLETED | OUTPATIENT
Start: 2025-05-26 | End: 2025-05-26

## 2025-05-26 RX ORDER — DIPHENHYDRAMINE HYDROCHLORIDE 50 MG/ML
25 INJECTION, SOLUTION INTRAMUSCULAR; INTRAVENOUS ONCE
Status: COMPLETED | OUTPATIENT
Start: 2025-05-26 | End: 2025-05-26

## 2025-05-26 NOTE — DISCHARGE INSTRUCTIONS
Thank you for seeking care at [facility] Emergency Department.  You have been seen and evaluated for a headache.    We reviewed the results from your visit in the emergency department.    Please read the instructions provided   If provided, take prescriptions as instructed.     Remember, your care process does not end after your visit today. Please follow-up with your doctor within 1-2 days for a follow-up check to ensure you are  improving, to see if you need any further evaluation/testing, or to evaluate for any alternate diagnoses.     Please return to the emergency department if you develop worsening of your headache or a new headache which is severe, associated with vision changes, difficulty with walking or coordination, difficulty with speech, numbness, tingling or weakness, associated with neck stiffness or fever, nausea or vomiting, if the headache is different from any other headache that you have had before, confusion, or if you develop any other new or concerning symptoms as these could be signs of more serious medical illness.    We hope you feel better.
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
## 2025-05-26 NOTE — ED INITIAL ASSESSMENT (HPI)
Pt to ED for migraine for the past 3 days. Pt also having symptoms of nausea, sound sensitivity and dizziness.

## 2025-06-20 ENCOUNTER — TELEPHONE (OUTPATIENT)
Dept: OBGYN CLINIC | Facility: CLINIC | Age: 35
End: 2025-06-20

## 2025-06-20 NOTE — TELEPHONE ENCOUNTER
Pt verified name and .     Pt cancelled appointment today and rescheduled for . Pt reports pregnancy termination with [facility] in May. Pt states she had regular menses last week. Pt denies irregular bleeding or pelvic/abdominal pain. Advised pt continue with schedule appointment on .

## 2025-06-20 NOTE — TELEPHONE ENCOUNTER
This patient did not attend her visit today and has not followed up on pregnancy with uncertain viability since mid-may. Please call her and have her come in. If you can't speak to her by phone, please send certified letter.

## 2025-06-27 ENCOUNTER — OFFICE VISIT (OUTPATIENT)
Dept: OBGYN CLINIC | Facility: CLINIC | Age: 35
End: 2025-06-27
Payer: COMMERCIAL

## 2025-06-27 VITALS
BODY MASS INDEX: 39.16 KG/M2 | HEART RATE: 66 BPM | HEIGHT: 63 IN | DIASTOLIC BLOOD PRESSURE: 82 MMHG | SYSTOLIC BLOOD PRESSURE: 114 MMHG | WEIGHT: 221 LBS

## 2025-06-27 DIAGNOSIS — Z30.015 ENCOUNTER FOR INITIAL PRESCRIPTION OF VAGINAL RING HORMONAL CONTRACEPTIVE: Primary | ICD-10-CM

## 2025-06-27 DIAGNOSIS — Z32.02 PREGNANCY EXAMINATION OR TEST, NEGATIVE RESULT: ICD-10-CM

## 2025-06-27 LAB
CONTROL LINE PRESENT WITH A CLEAR BACKGROUND (YES/NO): YES YES/NO
KIT LOT #: NORMAL NUMERIC
PREGNANCY TEST, URINE: NEGATIVE

## 2025-06-27 PROCEDURE — 3079F DIAST BP 80-89 MM HG: CPT | Performed by: ADVANCED PRACTICE MIDWIFE

## 2025-06-27 PROCEDURE — 99213 OFFICE O/P EST LOW 20 MIN: CPT | Performed by: ADVANCED PRACTICE MIDWIFE

## 2025-06-27 PROCEDURE — 81025 URINE PREGNANCY TEST: CPT | Performed by: ADVANCED PRACTICE MIDWIFE

## 2025-06-27 PROCEDURE — 3008F BODY MASS INDEX DOCD: CPT | Performed by: ADVANCED PRACTICE MIDWIFE

## 2025-06-27 PROCEDURE — 3074F SYST BP LT 130 MM HG: CPT | Performed by: ADVANCED PRACTICE MIDWIFE

## 2025-06-27 RX ORDER — ETONOGESTREL AND ETHINYL ESTRADIOL VAGINAL RING .015; .12 MG/D; MG/D
1 RING VAGINAL
Qty: 3 RING | Refills: 3 | Status: SHIPPED | OUTPATIENT
Start: 2025-06-27 | End: 2025-07-18

## 2025-06-27 NOTE — PROGRESS NOTES
Chief Complaint   Patient presents with    Follow - Up     Per patient follow up on ultrasound        HPI:   Charlie is 34 year old , here today to follow-up on prior pregnancy of uncertain viability. It was an undesired pregnancy. Once she got the ultrasound that showed a gestational sac she went to [facility] and took the medication for a medical termination. She has not done any follow-up since.  She desires to start the NuvaRing. Was on it before and was happy with the method. Denies tobacco/nicotine use, H/O DVT or stroke.    The patient's medical, surgical, family, social, and medication histories have been reviewed. See respective tabs for documentation.     Note: This is a gyn only visit. Pt has PCP and is referred back to PCP for care of any non-gyn concerns listed above in the Problem List.    ROS:  Review of Systems   Constitutional: Negative.    Genitourinary: Negative.        PHYSICAL EXAM:  Vitals:    25 1436   BP: 114/82   Pulse: 66     Physical Exam  Vitals reviewed.   Constitutional:       Appearance: Normal appearance.   HENT:      Head: Normocephalic.   Pulmonary:      Effort: Pulmonary effort is normal.   Neurological:      Mental Status: She is alert and oriented to person, place, and time.   Psychiatric:         Behavior: Behavior normal.         Thought Content: Thought content normal.         Judgment: Judgment normal.          Last pap: 2023 NILM/HPV negative    Recent Results (from the past 24 hours)   POC Urine pregnancy test [[id number]]    Collection Time: 25  3:22 PM   Result Value Ref Range    Pregnancy Test, Urine negative     Control Line Present with a clear background (yes/no) yes Yes/No    Kit Lot # [id number] Numeric    Kit Expiration Date 10/08/2026 Date         ASSESSMENT/PLAN:     Charlie was seen today for follow - up.    Diagnoses and all orders for this visit:    Encounter for initial prescription of vaginal ring hormonal contraceptive  -      Etonogestrel-Ethinyl Estradiol (NUVARING) 0.12-0.015 MG/24HR Vaginal Ring; Place 1 Ring vaginally every 28 days for 21 days. Remove ring after 3 weeks for 1 week of menses then replace.    Pregnancy examination or test, negative result  -     POC Urine pregnancy test [[id number]]       Follow-up/Return to clinic: 1 year    Counseling:   ACHES  Frequency of health screening and personal risks  Pap, SBE/CBE, mammography  Smoking cessation/avoidance encouraged    Patient verbalized understanding, All questions answered. No barriers to learning identified

## 2025-06-27 NOTE — PATIENT INSTRUCTIONS
Birth Control Methods  Birth control methods are used to help prevent pregnancy. There are many different methods to choose from. Talk to your healthcare provider about which method is right for you. Be sure to ask your provider about the effectiveness of each method. Also ask about the benefits, risks, and side effects of each method.  Hormones  Some birth control methods work by releasing hormones such as progestin and estrogen. These methods include hormone implants, hormone shots, the vaginal ring, the patch, and birth control pills. They all work by stopping ovulation (release of the egg from the ovary). The implant is a small device that needs to be placed in the upper arm by a trained healthcare provider. It works for up to 3 years. Hormone injections must be repeated every 3 months. The vaginal ring must be replaced monthly (it can be removed during the fourth week of each cycle). The patch must be replaced weekly (it is not worn during the fourth week of each cycle). Birth control pills must be taken every day. All of these methods are effective and can be stopped at any time.  Intrauterine device (IUD)  An IUD is a small, T-shaped device. It must be placed in the uterus by a trained healthcare provider. There are different types of IUDs available. They work by causing changes in the uterus that make it harder for sperm to reach the egg. Depending on the type of IUD you have, it may work for several years or longer. The IUD is a reversible birth control method. This means it can be removed at any time.  Condom  A condom is a sheath that forms a thin barrier between the penis and the vagina. It helps prevent pregnancy by keeping sperm from entering the vagina. When latex condoms are used, they have the added benefit of protecting against most STIs (sexually transmitted infections). Condoms are used each time there is sexual intercourse and should be discarded after each use. Ask your healthcare provider  about the different types of condoms available. These include both the male condom and female condom.  Spermicide  Spermicides come as foams, jellies, creams, suppositories, and tablets.  They help prevent pregnancy by killing sperm. When used alone they are not that reliable. They work best when combined with other birth control methods such as diaphragms and cervical caps.  Sponge, diaphragm, and cervical cap  All of these methods help prevent pregnancy by covering the opening of the uterus (cervix). This prevents sperm from passing through.  The sponge contains spermicide. It can be bought over the counter. The sponge must be left in place for at least 6 hours after the last time you have sex. However, it should not stay in place for more than 24 hours. It should be discarded after it is used.  The diaphragm and cervical cap must be fitted and prescribed by your healthcare provider. Both are used with spermicide. The diaphragm must be left in place for at least 6 hours after sex. However, it should not stay in place for more than 24 hours. It can be washed and reused. The cervical cap must be left in place for at least 6 hours after sex. However, it should not stay in place for more than 48 hours. It can be washed and reused.  Withdrawal method  This is when the man pulls his penis out of the vagina just before ejaculation (“coming”). This lowers the amount of sperm entering the vagina. Be aware that fluids released just before ejaculation often still contain some sperm, so this method is not as reliable as certain other methods.  Rhythm method  This method requires that you know when in your menstrual cycle you are likely to become pregnant. Then, you avoid sex during those days. This requires careful planning and good discipline. Your healthcare provider can explain more about how this works.  Tubal ligation and vasectomy  These are surgical methods to prevent pregnancy. Tubal ligation is an option for women.  The fallopian tubes are blocked or cut (ligated). This keeps the egg from passing into the uterus or sperm from reaching the egg. Vasectomy is an option for men. The tubes that normally carry sperm to the penis are either closed or blocked. Both tubal ligation and vasectomy are permanent birth control methods. This means reversal is either not possible or unlikely to work. They are good choices for women and men who know that they do not want to have children in the future.  StayWell last reviewed this educational content on 11/1/2017  © 3483-8060 [employer]. [address] [place] [address]. All rights reserved. This information is not intended as a substitute for professional medical care. Always follow your healthcare professional's instructions.        How Birth Control Works  Birth control prevents pregnancy by preventing conception. Some methods prevent an egg from maturing. Some keep the sperm and egg from meeting. And some methods work in both ways.   Preventing ovulation  Certain hormones help prevent an egg from maturing and being released. Hormone methods include:   Birth control pills  Skin patches  Contraceptive vaginal rings  Injections  Preventing sperm and egg from meeting  Methods that prevent the sperm and egg from joining include:  Barrier methods, such as the condom, the diaphragm, and the cervical cap  Spermicide  The IUD (intrauterine device)  Sterilization  Natural family planning  Some types of hormone methods  StayWell last reviewed this educational content on 4/1/2020  © 2330-0518 [employer]. [address] [place] [address]. All rights reserved. This information is not intended as a substitute for professional medical care. Always follow your healthcare professional's instructions.        Birth Control: Time-Release Hormones     Time-release hormones require a doctor's prescription.   Certain hormones can help prevent pregnancy. Hormones like  the ones used in birth control pills can be taken in other forms. These must be prescribed by your healthcare provider. Because there’s very little for you to do, you may find one of these methods easier to stick to than pills. Side effects for this method will vary depending on the type of time-release hormone you use. Talk to your healthcare provider for more information.   Pregnancy rates  Talk to your healthcare provider about the effectiveness of this birth control method.  Using time-release hormones  Methods to deliver hormones include:  A skin patch placed on your stomach, buttocks, arm, or shoulder. You replace the patch weekly.  A ring that you insert in your vagina, leave in for 3 weeks, and remove for 1 week.  Injections given in your arm or buttocks once every 3 months by your healthcare provider.  An implant placed under the skin in the upper arm by your healthcare provider. This can be left in place for up to 3 years.  The progestin IUDs placed by your healthcare provider. These can be left in place for 3 to 5 years depending on which one is chosen.  Pros  Lowest pregnancy rate of the birth control methods that can be reversed  No interruption to sex  Easy to use  Don’t require taking a pill each day  May decrease menstrual cramps, menstrual flow, and acne    Cons  Don't protect against sexually transmitted infections (STIs)  May cause irregular periods  May cause side effects such as nausea, weight gain, headaches, breast tenderness, fatigue, or mood changes (these often go away within 3 months)  May take up to a year for you to become fertile (able to get pregnant) after stopping injections  May increase the risk of blood clots, heart attack, and stroke    Time-release hormones may not be for you  Time-release hormones may not be for you if:  You are a smoker and over age 35  You have high blood pressure, gallbladder disease, liver disease, certain lipid disorders, cerebrovascular disease (stroke),  or heart disease  You have diabetes, migraines, clot in a vein or artery (thromboembolic disorder), lupus, or take medicines that may interfere with the hormones  In these cases, discuss the risks with your healthcare provider.  StayWell last reviewed this educational content on 4/1/2020 © 2000-2020 [employer]. [address] [place] [address], [place] [address]. All rights reserved. This information is not intended as a substitute for professional medical care. Always follow your healthcare professional's instructions.        Birth Control: Natural Family Planning     To use NFP, you keep daily records of the signs that show when you are fertile.   Natural family planning (NFP) is based on a woman's awareness of when she is likely to become pregnant (fertile). By learning how to tell when you're fertile, you can know when to not have sex. This can help prevent pregnancy. To learn NFP, it's advised that you take a class or work with a qualified teacher.   Pregnancy rates  Talk to your healthcare provider about how well this birth control method works.   Using NFP  A woman is fertile only during a certain part of her monthly cycle--just before and during ovulation. By learning when you ovulate, you can know when you're likely to be fertile. You estimate when you're ovulating by observing and keeping track of certain physical signs. You can then avoid sex or use a barrier method during that fertile time. But be aware that each woman's cycle and signs are different, and no woman's cycle is perfectly regular.   Pros  Both partners share responsibility  No known health risks  No side effects  Is inexpensive or free  If you abstain from sex during fertile periods, this method is approved by all Islam communities  Easy to stop if you decide you want to become pregnant    Cons  Takes time to learn  Requires daily observation and charting  Requires abstaining from sex or using a barrier method during fertile  periods (nine or more days per month)  Does not protect against sexually transmitted infections (STIs)    When natural family planning may not be for you  Natural family planning may not be for you if:  You don't have the full cooperation of your partner  You haven't received training from a qualified teacher  Your periods are not regular  You take certain medicines or should not get pregnant due to a medical condition  You just started having periods or you are approaching menopause  StayWell last reviewed this educational content on 5/1/2020 © 2000-2020 [employer]. [address] [place] [id number]. All rights reserved. This information is not intended as a substitute for professional medical care. Always follow your healthcare professional's instructions.        Birth Control: IUD (Intrauterine Device)    The IUD (intrauterine device) is small, flexible, and T-shaped. A trained healthcare provider places it in the uterus. The IUD is one of the most effective birth control methods. It's also reversible. This means it can be removed at any time by a trained healthcare provider. New IUDs are safe and don't have the risks of older types of IUDs.   Pregnancy rates  Talk to your healthcare provider about the effectiveness of this birth control method.  Types of IUDs  IUD insertion is done in the healthcare provider’s office. Two types of IUDs are available:  The copper IUD releases a small amount of copper into the uterus. The copper makes it harder for sperm to reach the egg. The device works for at least 10 years.  The progestin IUD releases a hormone called progestin. It causes changes in the uterus to help prevent pregnancy. The device works for 3 to 5 years, depending on which device is chosen. It may be recommended for women who have anemia or heavy and painful periods.  IUDs have thin strings that hang from the opening of the uterus into the vagina. This lets you check that the IUD stays  in place.   Things to know about IUDs  IUDs can be used by women who have never been pregnant or by women with a history of sexually transmitted infections (STIs) or tubal pregnancy.  It won't move from the uterus to any other part of the body.  There is a slight risk of the device coming out of the vagina (expulsion).  It may not work in women who have an abnormally shaped uterus.  A copper IUD may cause heavier periods and cramping.  Progestin IUD may cause light periods or no periods at all (irregular bleeding or spotting is possible and normal during first 3 to 6 months).  If you get a sexually transmitted infection with an IUD in place, symptoms may be more severe.    What to report to your healthcare provider  Be sure your healthcare provider knows if you have:  A sexually transmitted infection (STI) or possible STI  Liver problems  Blood clots (for progestin IUD only)  Breast cancer or a history of breast cancer (progestin IUD only)  [employer] last reviewed this educational content on 5/1/2020 © 2000-2020 [employer]. [address] [place] [address]. All rights reserved. This information is not intended as a substitute for professional medical care. Always follow your healthcare professional's instructions.        Levonorgestrel intrauterine device (IUD)  Brand Names: Kyleena, LILETTA, Mirena, Neetu  What is this medicine?  LEVONORGESTREL IUD (JAVIER voe nor russ trel) is a contraceptive (birth control) device. The device is placed inside the uterus by a healthcare professional. It is used to prevent pregnancy. This device can also be used to treat heavy bleeding that occurs during your period.  How should I use this medicine?  This device is placed inside the uterus by a health care professional.  Talk to your pediatrician regarding the use of this medicine in children. Special care may be needed.  What side effects may I notice from receiving this medicine?  Side effects that you should  report to your doctor or health care professional as soon as possible:  allergic reactions like skin rash, itching or hives, swelling of the face, lips, or tongue  fever, flu-like symptoms  genital sores  high blood pressure  no menstrual period for 6 weeks during use  pain, swelling, warmth in the leg  pelvic pain or tenderness  severe or sudden headache  signs of pregnancy  stomach cramping  sudden shortness of breath  trouble with balance, talking, or walking  unusual vaginal bleeding, discharge  yellowing of the eyes or skin  Side effects that usually do not require medical attention (report to your doctor or health care professional if they continue or are bothersome):  acne  breast pain  change in sex drive or performance  changes in weight  cramping, dizziness, or faintness while the device is being inserted  headache  irregular menstrual bleeding within first 3 to 6 months of use  nausea  What may interact with this medicine?  Do not take this medicine with any of the following medications:  amprenavir  bosentan  fosamprenavir  This medicine may also interact with the following medications:  aprepitant  armodafinil  barbiturate medicines for inducing sleep or treating seizures  bexarotene  boceprevir  griseofulvin  medicines to treat seizures like carbamazepine, ethotoin, felbamate, oxcarbazepine, phenytoin, topiramate  modafinil  pioglitazone  rifabutin  rifampin  rifapentine  some medicines to treat HIV infection like atazanavir, efavirenz, indinavir, lopinavir, nelfinavir, tipranavir, ritonavir  [place]'s wort  warfarin  What if I miss a dose?  This does not apply. Depending on the brand of device you have inserted, the device will need to be replaced every 3 to 6 years if you wish to continue using this type of birth control.  Where should I keep my medicine?  This does not apply.  What should I tell my health care provider before I take this medicine?  They need to know if you have any of these  conditions:  abnormal Pap smear  cancer of the breast, uterus, or cervix  diabetes  endometritis  genital or pelvic infection now or in the past  have more than one sexual partner or your partner has more than one partner  heart disease  history of an ectopic or tubal pregnancy  immune system problems  IUD in place  liver disease or tumor  problems with blood clots or take blood-thinners  seizures  use intravenous drugs  uterus of unusual shape  vaginal bleeding that has not been explained  an unusual or allergic reaction to levonorgestrel, other hormones, silicone, or polyethylene, medicines, foods, dyes, or preservatives  pregnant or trying to get pregnant  breast-feeding  What should I watch for while using this medicine?  Visit your doctor or health care professional for regular check ups. See your doctor if you or your partner has sexual contact with others, becomes HIV positive, or gets a sexual transmitted disease.  This product does not protect you against HIV infection (AIDS) or other sexually transmitted diseases.  You can check the placement of the IUD yourself by reaching up to the top of your vagina with clean fingers to feel the threads. Do not pull on the threads. It is a good habit to check placement after each menstrual period. Call your doctor right away if you feel more of the IUD than just the threads or if you cannot feel the threads at all.  The IUD may come out by itself. You may become pregnant if the device comes out. If you notice that the IUD has come out use a backup birth control method like condoms and call your health care provider.  Using tampons will not change the position of the IUD and are okay to use during your period.  This IUD can be safely scanned with magnetic resonance imaging (MRI) only under specific conditions. Before you have an MRI, tell your healthcare provider that you have an IUD in place, and which type of IUD you have in place.  NOTE:This sheet is a summary. It  may not cover all possible information. If you have questions about this medicine, talk to your doctor, pharmacist, or health care provider. Copyright© 2020 [employer]        Etonogestrel implant  What is this medicine?  ETONOGESTREL (et [place] LINDA trel) is a contraceptive (birth control) device. It is used to prevent pregnancy. It can be used for up to 3 years.  How should I use this medicine?  This device is inserted just under the skin on the inner side of your upper arm by a health care professional.  Talk to your pediatrician regarding the use of this medicine in children. Special care may be needed.  What side effects may I notice from receiving this medicine?  Side effects that you should report to your doctor or health care professional as soon as possible:  allergic reactions like skin rash, itching or hives, swelling of the face, lips, or tongue  breast lumps  changes in emotions or moods  depressed mood  heavy or prolonged menstrual bleeding  pain, irritation, swelling, or bruising at the insertion site  scar at site of insertion  signs of infection at the insertion site such as fever, and skin redness, pain or discharge  signs of pregnancy  signs and symptoms of a blood clot such as breathing problems; changes in vision; chest pain; severe, sudden headache; pain, swelling, warmth in the leg; trouble speaking; sudden numbness or weakness of the face, arm or leg  signs and symptoms of liver injury like dark yellow or brown urine; general ill feeling or flu-like symptoms; light-colored stools; loss of appetite; nausea; right upper belly pain; unusually weak or tired; yellowing of the eyes or skin  unusual vaginal bleeding, discharge  signs and symptoms of a stroke like changes in vision; confusion; trouble speaking or understanding; severe headaches; sudden numbness or weakness of the face, arm or leg; trouble walking; dizziness; loss of balance or coordination  Side effects that usually do not require  medical attention (report to your doctor or health care professional if they continue or are bothersome):  acne  back pain  breast pain  changes in weight  dizziness  general ill feeling or flu-like symptoms  headache  irregular menstrual bleeding  nausea  sore throat  vaginal irritation or inflammation  What may interact with this medicine?  Do not take this medicine with any of the following medications:  amprenavir  fosamprenavir  This medicine may also interact with the following medications:  acitretin  aprepitant  armodafinil  bexarotene  bosentan  carbamazepine  certain medicines for fungal infections like fluconazole, ketoconazole, itraconazole and voriconazole  certain medicines to treat hepatitis, HIV or AIDS  cyclosporine  felbamate  griseofulvin  lamotrigine  modafinil  oxcarbazepine  phenobarbital  phenytoin  primidone  rifabutin  rifampin  rifapentine  [place]'s wort  topiramate  What if I miss a dose?  This does not apply.  Where should I keep my medicine?  This drug is given in a hospital or clinic and will not be stored at home.  What should I tell my health care provider before I take this medicine?  They need to know if you have any of these conditions:  abnormal vaginal bleeding  blood vessel disease or blood clots  breast, cervical, endometrial, ovarian, liver, or uterine cancer  diabetes  gallbladder disease  heart disease or recent heart attack  high blood pressure  high cholesterol or triglycerides  kidney disease  liver disease  migraine headaches  seizures  stroke  tobacco smoker  an unusual or allergic reaction to etonogestrel, anesthetics or antiseptics, other medicines, foods, dyes, or preservatives  pregnant or trying to get pregnant  breast-feeding  What should I watch for while using this medicine?  This product does not protect you against HIV infection (AIDS) or other sexually transmitted diseases.  You should be able to feel the implant by pressing your fingertips over the skin  where it was inserted. Contact your doctor if you cannot feel the implant, and use a non-hormonal birth control method (such as condoms) until your doctor confirms that the implant is in place. Contact your doctor if you think that the implant may have broken or become bent while in your arm.  You will receive a user card from your health care provider after the implant is inserted. The card is a record of the location of the implant in your upper arm and when it should be removed. Keep this card with your health records.  NOTE:This sheet is a summary. It may not cover all possible information. If you have questions about this medicine, talk to your doctor, pharmacist, or health care provider. Copyright© 2020 [employer]

## (undated) NOTE — LETTER
Date & Time: 3/25/2025, 7:14 PM  Patient: Charlie Arango  Encounter Provider(s):    Keiko Quarles APRN       To Whom It May Concern:    Charlie Arango was seen and treated in our department on 3/25/2025. She should not return to school and work until 03/27/2025.    If you have any questions or concerns, please do not hesitate to call.        _____________________________  Physician/[facility] Signature

## (undated) NOTE — LETTER
Charlie Arango, :9/3/1990    CONSENT FOR PROCEDURE/SEDATION    1. I authorize the performance upon Charlie Arango  the following: Insertion Intrauterine Device Paragard    2. I authorize Dr. Jess Garcia MD (and whomever is designated as the doctor’s assistant), to perform the above-mentioned procedures.    3. If any unforeseen conditions arise during this procedure calling for additional  procedures, operations, or medications (including anesthesia and blood transfusion), I further request and authorize the doctor to do whatever he/she deems advisable in my interest.    4. I consent to the taking and reproduction of any photographs in the course of this procedure for professional purposes.    5. I consent to the administration of such sedation as may be considered necessary or advisable by the physician responsible for this service, with the exception of ______________________________________________________    6. I have been informed by my doctor of the nature and purpose of this procedure sedation, possible alternative methods of treatment, risk involved and possible complications.      Signature of Patient:_______________________________________________    Signature of person authorized to consent for patient:  _______________________________________________________________    Relationship to patient: ____________________________________________    Witness: _________________________________________ Date:___________     Physician Signature: _______________________________ Date:___________